# Patient Record
Sex: FEMALE | Race: WHITE | NOT HISPANIC OR LATINO | Employment: OTHER | URBAN - METROPOLITAN AREA
[De-identification: names, ages, dates, MRNs, and addresses within clinical notes are randomized per-mention and may not be internally consistent; named-entity substitution may affect disease eponyms.]

---

## 2017-04-07 ENCOUNTER — OFFICE VISIT (OUTPATIENT)
Dept: DERMATOLOGY | Facility: CLINIC | Age: 62
End: 2017-04-07
Payer: MEDICARE

## 2017-04-07 DIAGNOSIS — D48.5 NEOPLASM OF UNCERTAIN BEHAVIOR OF SKIN: ICD-10-CM

## 2017-04-07 DIAGNOSIS — L82.1 SK (SEBORRHEIC KERATOSIS): ICD-10-CM

## 2017-04-07 DIAGNOSIS — Z85.828 PERSONAL HISTORY OF MALIGNANT NEOPLASM OF SKIN: ICD-10-CM

## 2017-04-07 DIAGNOSIS — L57.0 AK (ACTINIC KERATOSIS): Primary | ICD-10-CM

## 2017-04-07 PROCEDURE — 11100 PR BIOPSY OF SKIN LESION: CPT | Mod: PBBFAC | Performed by: DERMATOLOGY

## 2017-04-07 PROCEDURE — 88305 TISSUE EXAM BY PATHOLOGIST: CPT | Performed by: PATHOLOGY

## 2017-04-07 PROCEDURE — 11100 PR BIOPSY OF SKIN LESION: CPT | Mod: S$PBB,,, | Performed by: DERMATOLOGY

## 2017-04-07 PROCEDURE — 99999 PR PBB SHADOW E&M-EST. PATIENT-LVL III: CPT | Mod: PBBFAC,,, | Performed by: DERMATOLOGY

## 2017-04-07 PROCEDURE — 99214 OFFICE O/P EST MOD 30 MIN: CPT | Mod: 25,S$PBB,, | Performed by: DERMATOLOGY

## 2017-04-07 PROCEDURE — 99213 OFFICE O/P EST LOW 20 MIN: CPT | Mod: PBBFAC,25 | Performed by: DERMATOLOGY

## 2017-04-07 NOTE — MR AVS SNAPSHOT
[facility]  [address]  [facility] [address]  Phone: [phone]  Fax: [phone]                  Antoinette Hayden   2017 2:00 PM   Office Visit    Description:  Female : 1955   Provider:  Ivone Gray MD   Department:  [facility]           Reason for Visit     Skin Check           Diagnoses this Visit        Comments    AK (actinic keratosis)    -  Primary     Neoplasm of uncertain behavior of skin         SK (seborrheic keratosis)         Personal history of malignant neoplasm of skin                To Do List           Future Appointments        Provider Department Dept Phone    2017 11:00 AM [facility] NURSE, DERM [facility] [phone]    2017 3:30 PM RM Ag - Endo/Diab/Metab [phone]      Goals (5 Years of Data)     None      [facility]     [facility] Care [facility] -  Assistance  Unless otherwise directed by your provider, please contact [facility], our nurse care line that is available for  assistance.     Registered nurses in the [facility] provide: appointment scheduling, clinical advisement, health education, and other advisory services.  Call: [phone] (toll free)               Medications           Message regarding Medications     Verify the changes and/or additions to your medication regime listed below are the same as discussed with your clinician today.  If any of these changes or additions are incorrect, please notify your healthcare provider.             Verify that the below list of medications is an accurate representation of the medications you are currently taking.  If none reported, the list may be blank. If incorrect, please contact your healthcare provider. Carry this list with you in case of emergency.           Current Medications     clonazePAM (KLONOPIN) 0.5 MG tablet Take 1 tablet (0.5 mg total) by mouth 2 (two) times daily as needed  for Anxiety.    cyanocobalamin (VITAMIN B-12) 1000 MCG tablet Take 100 mcg by mouth once daily.    escitalopram oxalate (LEXAPRO) 20 MG tablet Take 1 tablet (20 mg total) by mouth once daily.    gabapentin (NEURONTIN) 300 MG capsule TAKE 1 CAPSULE(300 MG) BY MOUTH THREE TIMES DAILY    gemfibrozil (LOPID) 600 MG tablet Take 1 tablet (600 mg total) by mouth 2 (two) times daily before meals.    glucosamine-chondroitin 500-400 mg tablet Take 1 tablet by mouth 2 (two) times daily.     ibuprofen (ADVIL,MOTRIN) 200 MG tablet Take 200 mg by mouth every 6 (six) hours as needed.    levothyroxine (SYNTHROID) 25 MCG tablet TAKE 1 TABLET(25 MCG) BY MOUTH EVERY DAY    MULTIVIT WITH CALCIUM,IRON,MIN (WOMEN'S DAILY MULTIVITAMIN ORAL) Take by mouth every morning.     oxybutynin (DITROPAN-XL) 10 MG 24 hr tablet TAKE 1 TABLET(10 MG) BY MOUTH EVERY DAY           Clinical Reference Information           Allergies as of 4/7/2017     Egg Derived    Egg    No Known Drug Allergies      Immunizations Administered on Date of Encounter - 4/7/2017     None      Language Assistance Services     ATTENTION: Language assistance services are available, free of charge. Please call [phone].      ATENCIÓN: Si juniela brett, tiene a dawn disposición servicios gratuitos de asistencia lingüística. Llame al [phone].     RACHEL Ý: N?u b?n nói Ti?ng Vi?t, có các d?ch v? h? tr? ngôn ng? mi?n phí dành cho b?n. G?i s? [phone].         [facility] complies with applicable Federal civil rights laws and does not discriminate on the basis of race, color, national origin, age, disability, or sex.

## 2017-04-07 NOTE — PROGRESS NOTES
Subjective:       Patient ID:  Antoinette Hayden is a 61 y.o. female who presents for   Chief Complaint   Patient presents with    Skin Check     UBSE     HPI  Last skin check 8/2016 with [facility].  She presents today for evaluation of a spot on her L cheek x several weeks, non healing, denies bleeding, no prior treatments.  She also noticed rough pink spots on her arms, thinks some of them have been treated with LN.  Denies bleeding.    Derm Hx: multiple NMSCs; Last skin cancer L upper arm SCCIS ; PDT face in 10/2016  Past Medical History:   Diagnosis Date    AK (actinic keratosis) 7-14    1st PDT face,60mins    Allergy     Anxiety     Arthritis     Depression     Fever blister     Hyperlipidemia     Keloid scar of skin     PONV (postoperative nausea and vomiting)     Scar of face     SCC (squamous cell carcinoma) excised 9/7/16    R upper arm    SCC (squamous cell carcinoma) excised 9/21/16    in situ, L upper arm    SQUAMOUS CELL CARCINOMA     skin CA on face, arm and chest     Review of Systems   Constitutional: Negative for fever, chills, fatigue and malaise.   Skin: Positive for activity-related sunscreen use. Negative for daily sunscreen use and recent sunburn.   Hematologic/Lymphatic: Does not bruise/bleed easily.        Objective:    Physical Exam   Constitutional: She appears well-developed and well-nourished. No distress.   Neurological: She is alert and oriented to person, place, and time. She is not disoriented.   Psychiatric: She has a normal mood and affect.   Skin:   Areas Examined (abnormalities noted in diagram):   Scalp / Hair Palpated and Inspected  Head / Face Inspection Performed  Neck Inspection Performed  Chest / Axilla Inspection Performed  Abdomen Inspection Performed  Genitals / Buttocks / Groin Inspection Performed  Back Inspection Performed  RUE Inspected  LUE Inspection Performed  RLE Inspected  LLE Inspection Performed  Nails and Digits Inspection Performed                        Diagram Legend     Erythematous scaling macule/papule c/w actinic keratosis       Vascular papule c/w angioma      Pigmented verrucoid papule/plaque c/w seborrheic keratosis      Yellow umbilicated papule c/w sebaceous hyperplasia      Irregularly shaped tan macule c/w lentigo     1-2 mm smooth white papules consistent with Milia      Movable subcutaneous cyst with punctum c/w epidermal inclusion cyst      Subcutaneous movable cyst c/w pilar cyst      Firm pink to brown papule c/w dermatofibroma      Pedunculated fleshy papule(s) c/w skin tag(s)      Evenly pigmented macule c/w junctional nevus     Mildly variegated pigmented, slightly irregular-bordered macule c/w mildly atypical nevus      Flesh colored to evenly pigmented papule c/w intradermal nevus       Pink pearly papule/plaque c/w basal cell carcinoma      Erythematous hyperkeratotic cursted plaque c/w SCC      Surgical scar with no sign of skin cancer recurrence      Open and closed comedones      Inflammatory papules and pustules      Verrucoid papule consistent consistent with wart     Erythematous eczematous patches and plaques     Dystrophic onycholytic nail with subungual debris c/w onychomycosis     Umbilicated papule    Erythematous-base heme-crusted tan verrucoid plaque consistent with inflamed seborrheic keratosis     Erythematous Silvery Scaling Plaque c/w Psoriasis     See annotation      Assessment / Plan:      Pathology Orders:      Normal Orders This Visit    Tissue Specimen To Pathology, Dermatology     Questions:    Directional Terms:  Other(comment)    Clinical information:  r/o HAK vs SCC vs other    Specific Site:  L cheek        AK (actinic keratosis)  Recommended PDT x 2 to forearms     Neoplasm of uncertain behavior of skin  -     Tissue Specimen To Pathology, Dermatology  Shave biopsy procedure note:    Shave biopsy performed after verbal consent icluding risk of infection, scar, recurrence, need for additional treatment of site.  Area prepped with alcohol, anesthetized with approximately 1.0cc of 1% lidocaine with epinephrine. Lesional tissue shaved with razor blade. Hemostasis achieved with application of aluminum chloride followed by hyfrecation. No complications. Dressing applied. Wound care explained.    SK (seborrheic keratosis)  These are benign inherited growths without a malignant potential. Reassurance given to patient. No treatment is necessary.     Personal history of malignant neoplasm of skin  Area(s) of previous NMSC evaluated with no signs of recurrence.    Skin examination performed today including at least 6 points as noted in physical examination           Return for pending Pathology.

## 2017-04-27 ENCOUNTER — INITIAL CONSULT (OUTPATIENT)
Dept: DERMATOLOGY | Facility: CLINIC | Age: 62
End: 2017-04-27
Payer: MEDICARE

## 2017-04-27 VITALS
DIASTOLIC BLOOD PRESSURE: 79 MMHG | HEART RATE: 61 BPM | HEIGHT: 65 IN | SYSTOLIC BLOOD PRESSURE: 118 MMHG | BODY MASS INDEX: 29.16 KG/M2 | WEIGHT: 175 LBS

## 2017-04-27 DIAGNOSIS — C44.329 SQUAMOUS CELL CARCINOMA OF SKIN OF LEFT CHEEK: Primary | ICD-10-CM

## 2017-04-27 PROCEDURE — 99999 PR PBB SHADOW E&M-EST. PATIENT-LVL III: CPT | Mod: PBBFAC,,, | Performed by: DERMATOLOGY

## 2017-04-27 PROCEDURE — 99214 OFFICE O/P EST MOD 30 MIN: CPT | Mod: S$PBB,,, | Performed by: DERMATOLOGY

## 2017-04-27 PROCEDURE — 99213 OFFICE O/P EST LOW 20 MIN: CPT | Mod: PBBFAC | Performed by: DERMATOLOGY

## 2017-04-27 NOTE — PROGRESS NOTES
"REFERRING MD:  Iovne Gray MD    CHIEF COMPLAINT:  New patient being consulted for Mohs' surgery evaluation.    HISTORY OF PRESENT ILLNESS:  62 y.o. female presents with a 3-4 month(s) history of growth on the L cheek.  Has had a long-standing history of skin cancers treated by Dr. Irving - "he just dug them out" and she has profound scarring on her face from these procedures.  Had forehead flap that failed, requiring her to see Dr Arellano. Now seeing Dr. Watson and Isaac for skin checks.     Negative for scabbing.  Positive for crusting.  Positive for bleeding.  Negative for itching.    Biopsy consistent with squamous cell carcinoma.     No prior treatment.    Pacemaker: No  Defibrillator: No  Artificial joints: No  Artificial heart valves: No    PAST MEDICAL HISTORY:  Past Medical History:   Diagnosis Date    AK (actinic keratosis) 7-14    1st PDT face,60mins    Allergy     Anxiety     Arthritis     Depression     Fever blister     Hyperlipidemia     Keloid scar of skin     Mechanical heart valve present     PONV (postoperative nausea and vomiting)     Scar of face     SCC (squamous cell carcinoma) excised 16    R upper arm    SCC (squamous cell carcinoma) excised 16    in situ, L upper arm    SQUAMOUS CELL CARCINOMA     skin CA on face, arm and chest       PAST SURGICAL HISTORY:  Past Surgical History:   Procedure Laterality Date     SECTION, LOW TRANSVERSE      FACIAL RECONSTRUCTION SURGERY      FINGER SURGERY      HYSTERECTOMY      LUNG REMOVAL, PARTIAL  2013    top portion    right lung resection  2013    [facility] at [facility]        SOCIAL HISTORY:  Dependencies:  former smoker    PERTINENT MEDICATIONS:  See medications list.    ALLERGIES:  Egg derived; Egg; and No known drug allergies    ROS:  Skin: See HPI  Constitutional: No fatigue, fever, malaise, weight loss, or night sweats.  Cardiovascular: No chest pain, palpitations, or edema.  Respiratory: No coughing, " wheezing, SOB, or sputum production.    Physical Exam   HENT:   Head:             General: Mood and affect normal. Alert and orient X3. Normal appearance.  Head/Face: L mid cheek with a 4 x 4 mm pink bx site located 2.5 cm laterally from the left alar base and 2 cm superiorly.   Neck:  no suspicious lesions  Lymph nodes: Bilateral pre-auricular, post-auricular, anterior cervical, posterior cervical, sublingual, submental, and occipital are not enlarged    IMPRESSION:  Biopsy proven superficially invasive squamous cell carcinoma, L cheek, path# [id number].    PLAN:  The diagnosis and the pathology report were discussed in detail with the patient. Treatment options were reviewed, including Mohs Micrographic Surgery, radiation, topical therapy, and standard excision.  After careful review of patient's history and physical exam, and after discussion of treatment options, the decision was made to perform Mohs micrographic surgery.    Scheduled patient for Mohs Micrographic Surgery. Risks, benefits, and alternatives of Mohs' surgery discussed with the patient. Discussed repair options including complex closure, skin flap, skin graft and second intention healing with the patient. Pre-operative instructions provided to the patient.

## 2017-04-27 NOTE — MR AVS SNAPSHOT
[facility] - Dermatology Surgery  [address] [address]  Phone: [phone]  Fax: [phone]                  Antoinette Hayden   2017 3:10 PM   Initial consult    Description:  Female : 1955   Provider:  Kris Munguia MD   Department:  [facility] - [facility] Surgery           Reason for Visit     Squamous Cell Carcinoma                To Do List           Future Appointments        Provider Department Dept Phone    2017 3:10 PM Kris Munguia MD [facility] Dermatology Surgery [phone]    2017 10:00 AM PDT, APPT [facility] Dermatology [phone]    2017 10:00 AM PDT, APPT [facility] [phone]    2017 8:00 AM AGUILA Rooney [facility] Endocrinology [phone]      Goals (5 Years of Data)     None      [facility] On [facility]     [facility] Care Line -  Assistance  Unless otherwise directed by your provider, please contact [facility], our nurse care line that is available for  assistance.     Registered nurses in the [facility] provide: appointment scheduling, clinical advisement, health education, and other advisory services.  Call: [phone] (toll free)               Medications           Message regarding Medications     Verify the changes and/or additions to your medication regime listed below are the same as discussed with your clinician today.  If any of these changes or additions are incorrect, please notify your healthcare provider.             Verify that the below list of medications is an accurate representation of the medications you are currently taking.  If none reported, the list may be blank. If incorrect, please contact your healthcare provider. Carry this list with you in case of emergency.           Current Medications     clonazePAM (KLONOPIN) 0.5 MG tablet Take 1 tablet (0.5 mg total) by mouth 2 (two) times daily as needed for Anxiety.    cyanocobalamin (VITAMIN B-12) 1000  "MCG tablet Take 100 mcg by mouth once daily.    escitalopram oxalate (LEXAPRO) 20 MG tablet Take 1 tablet (20 mg total) by mouth once daily.    gabapentin (NEURONTIN) 300 MG capsule Take 1 capsule (300 mg total) by mouth 3 (three) times daily.    gemfibrozil (LOPID) 600 MG tablet Take 1 tablet (600 mg total) by mouth 2 (two) times daily before meals.    glucosamine-chondroitin 500-400 mg tablet Take 1 tablet by mouth 2 (two) times daily.     ibuprofen (ADVIL,MOTRIN) 200 MG tablet Take 200 mg by mouth every 6 (six) hours as needed.    levothyroxine (SYNTHROID) 25 MCG tablet Take 1 tablet (25 mcg total) by mouth before breakfast.    MULTIVIT WITH CALCIUM,IRON,MIN (WOMEN'S DAILY MULTIVITAMIN ORAL) Take by mouth every morning.     oxybutynin (DITROPAN-XL) 10 MG 24 hr tablet Take 1 tablet (10 mg total) by mouth once daily.           Clinical Reference Information           Your Vitals Were     BP Pulse Height Weight BMI    118/79 (BP Location: Left arm, Patient Position: Sitting, BP Method: Automatic) 61 5' 5" (1.651 m) 79.4 kg (175 lb) 29.12 kg/m2      Blood Pressure          Most Recent Value    BP  118/79      Allergies as of 4/27/2017     Egg Derived    Egg    No Known Drug Allergies      Immunizations Administered on Date of Encounter - 4/27/2017     None      Language Assistance Services     ATTENTION: Language assistance services are available, free of charge. Please call [phone].      ATENCIÓN: Si habla brett, tiene a dawn disposición servicios gratuitos de asistencia lingüística. Zari al [phone].     [facility] Ý: N?u b?n nói Ti?ng Vi?t, có các d?ch v? h? tr? ngôn ng? mi?n phí dành cho b?n. G?i s? [phone].         [facility] complies with applicable Federal civil rights laws and does not discriminate on the basis of race, color, national origin, age, disability, or sex.        "

## 2017-05-02 ENCOUNTER — CLINICAL SUPPORT (OUTPATIENT)
Dept: DERMATOLOGY | Facility: CLINIC | Age: 62
End: 2017-05-02
Payer: MEDICARE

## 2017-05-02 ENCOUNTER — PROCEDURE VISIT (OUTPATIENT)
Dept: DERMATOLOGY | Facility: CLINIC | Age: 62
End: 2017-05-02
Payer: MEDICARE

## 2017-05-02 VITALS
WEIGHT: 175 LBS | BODY MASS INDEX: 29.16 KG/M2 | HEIGHT: 65 IN | DIASTOLIC BLOOD PRESSURE: 79 MMHG | HEART RATE: 55 BPM | SYSTOLIC BLOOD PRESSURE: 122 MMHG

## 2017-05-02 DIAGNOSIS — C44.329 SQUAMOUS CELL CARCINOMA OF SKIN OF LEFT CHEEK: Primary | ICD-10-CM

## 2017-05-02 DIAGNOSIS — L57.0 AK (ACTINIC KERATOSIS): ICD-10-CM

## 2017-05-02 PROCEDURE — 99212 OFFICE O/P EST SF 10 MIN: CPT | Mod: 25,PBBFAC

## 2017-05-02 PROCEDURE — 99499 UNLISTED E&M SERVICE: CPT | Mod: S$PBB,,, | Performed by: DERMATOLOGY

## 2017-05-02 PROCEDURE — 17312 MOHS ADDL STAGE: CPT | Mod: S$PBB,,, | Performed by: DERMATOLOGY

## 2017-05-02 PROCEDURE — 96567 PDT DSTR PRMLG LES SKN: CPT | Mod: PBBFAC

## 2017-05-02 PROCEDURE — 99999 PR PBB SHADOW E&M-EST. PATIENT-LVL II: CPT | Mod: 25,PBBFAC,,

## 2017-05-02 PROCEDURE — 99499 UNLISTED E&M SERVICE: CPT | Mod: ,,, | Performed by: DERMATOLOGY

## 2017-05-02 PROCEDURE — 13132 CMPLX RPR F/C/C/M/N/AX/G/H/F: CPT | Mod: 51,S$PBB,, | Performed by: DERMATOLOGY

## 2017-05-02 PROCEDURE — 17311 MOHS 1 STAGE H/N/HF/G: CPT | Mod: S$PBB,,, | Performed by: DERMATOLOGY

## 2017-05-02 RX ORDER — OXYCODONE AND ACETAMINOPHEN 5; 325 MG/1; MG/1
1 TABLET ORAL
Qty: 20 TABLET | Refills: 0 | Status: SHIPPED | OUTPATIENT
Start: 2017-05-02 | End: 2017-11-03

## 2017-05-02 RX ADMIN — AMINOLEVULINIC ACID HYDROCHLORIDE 1.5 ML: KIT at 10:05

## 2017-05-02 NOTE — MR AVS SNAPSHOT
[facility] - Dermatology Surgery  [address]  [facility] [address]  Phone: [phone]  Fax: [phone]                  Antoinette Hayden   2017 7:30 AM   Procedure visit    Description:  Female : 1955   Provider:  Kris Munguia MD   Department:  [facility] - Dermatology Surgery           Reason for Visit     Squamous Cell Carcinoma           Diagnoses this Visit        Comments    Squamous cell carcinoma of skin of left cheek    -  Primary            To Do List           Future Appointments        Provider Department Dept Phone    2017 10:00 AM PDT, APPT [facility] Dermatology [phone]    2017 8:00 AM AGUILA Rooney [facility] Endocrinology [phone]      Goals (5 Years of Data)     None       These Medications        Disp Refills Start End    oxycodone-acetaminophen (PERCOCET) 5-325 mg per tablet 20 tablet 0 2017     Take 1 tablet by mouth every 4 to 6 hours as needed for Pain. - Oral    Pharmacy: [facility] [address] [place] [address], [place] - [address] Ph #: [phone]         [facility]     [facility] Nurse Care Line -  Assistance  Unless otherwise directed by your provider, please contact [facility], our nurse care line that is available for  assistance.     Registered nurses in the [facility] provide: appointment scheduling, clinical advisement, health education, and other advisory services.  Call: [phone] (toll free)               Medications           Message regarding Medications     Verify the changes and/or additions to your medication regime listed below are the same as discussed with your clinician today.  If any of these changes or additions are incorrect, please notify your healthcare provider.        START taking these NEW medications        Refills    oxycodone-acetaminophen (PERCOCET) 5-325 mg per tablet 0    Sig: Take 1 tablet by mouth every 4 to 6 hours as needed  "for Pain.    Class: Print    Route: Oral           Verify that the below list of medications is an accurate representation of the medications you are currently taking.  If none reported, the list may be blank. If incorrect, please contact your healthcare provider. Carry this list with you in case of emergency.           Current Medications     clonazePAM (KLONOPIN) 0.5 MG tablet Take 1 tablet (0.5 mg total) by mouth 2 (two) times daily as needed for Anxiety.    cyanocobalamin (VITAMIN B-12) 1000 MCG tablet Take 100 mcg by mouth once daily.    escitalopram oxalate (LEXAPRO) 20 MG tablet Take 1 tablet (20 mg total) by mouth once daily.    gabapentin (NEURONTIN) 300 MG capsule Take 1 capsule (300 mg total) by mouth 3 (three) times daily.    gemfibrozil (LOPID) 600 MG tablet Take 1 tablet (600 mg total) by mouth 2 (two) times daily before meals.    glucosamine-chondroitin 500-400 mg tablet Take 1 tablet by mouth 2 (two) times daily.     ibuprofen (ADVIL,MOTRIN) 200 MG tablet Take 200 mg by mouth every 6 (six) hours as needed.    levothyroxine (SYNTHROID) 25 MCG tablet Take 1 tablet (25 mcg total) by mouth before breakfast.    MULTIVIT WITH CALCIUM,IRON,MIN (WOMEN'S DAILY MULTIVITAMIN ORAL) Take by mouth every morning.     oxybutynin (DITROPAN-XL) 10 MG 24 hr tablet Take 1 tablet (10 mg total) by mouth once daily.    oxycodone-acetaminophen (PERCOCET) 5-325 mg per tablet Take 1 tablet by mouth every 4 to 6 hours as needed for Pain.           Clinical Reference Information           Your Vitals Were     BP Pulse Height Weight BMI    122/79 (BP Location: Left arm, Patient Position: Sitting, BP Method: Automatic) 55 5' 5" (1.651 m) 79.4 kg (175 lb) 29.12 kg/m2      Blood Pressure          Most Recent Value    BP  122/79      Allergies as of 5/2/2017     Egg Derived    Egg    No Known Drug Allergies      Immunizations Administered on Date of Encounter - 5/2/2017     None      Language Assistance Services     ATTENTION: " Language assistance services are available, free of charge. Please call [phone].      ATENCIÓN: Si habla brett, tiene a dawn disposición servicios gratuitos de asistencia lingüística. Llame al [phone].     CHÚ Ý: N?u b?n nói Ti?ng Vi?t, có các d?ch v? h? tr? ngôn ng? mi?n phí dành cho b?n. G?i s? [phone].         [facility] complies with applicable Federal civil rights laws and does not discriminate on the basis of race, color, national origin, age, disability, or sex.

## 2017-05-02 NOTE — PROGRESS NOTES
PROCEDURE: Mohs' Micrographic Surgery    INDICATION: Location in non-mask areas of face where maximum conservation of tumor-free tissue is needed. Biopsy-proven skin cancer of cosmetically and functionally important areas, including head, neck, genital, hand, foot, or areas known for having difficulty in healing, such as the lower anterior legs. Tumor with ill-defined borders.    REFERRING MD: Ivone Gray MD    CASE NUMBER:     ANESTHETIC: 4 cc 0.5% Lidocaine with Epi 1:200,000 mixed 1:1 with 0.5% Bupivacaine    SURGICAL PREP: Hibiclens    SURGEON: Kris Munguia MD    ASSISTANTS: Aditi Ham PA-C and Ruth Magallanes, Surg Tech    PREOPERATIVE DIAGNOSIS: squamous cell carcinoma    POSTOPERATIVE DIAGNOSIS: squamous cell carcinoma    PATHOLOGIC DIAGNOSIS: squamous cell carcinoma- superficially invasive    HISTOLOGY OF SPECIMENS IN FIRST STAGE:   Tumor Type: Tumor seen. Superficial squamous cell carcinoma: Proliferation of squamous cells exhibiting atypia and infiltrating within the superficial papillary dermis.   Depth of Invasion: epidermis, dermis and subcutaneous tissue  Perineural Invasion: No    HISTOLOGY OF SPECIMENS IN SUBSEQUENT STAGES:  · Tumor Type: No tumor seen.    STAGES OF MOHS' SURGERY PERFORMED: 2    TUMOR-FREE PLANE ACHIEVED: Yes    HEMOSTASIS: electrocoagulation     SPECIMENS: 3 (2 in stage A and 1 in stage B)    LOCATION: left (mid) cheek. Patient verified location.    INITIAL LESION SIZE: 0.4 x 0.5 cm    FINAL DEFECT SIZE: 0.9 x 1.0 cm    WOUND REPAIR/DISPOSITION: The patient tolerated Mohs' Micrographic Surgery for a squamous cell carcinoma very well. When the tumor was completely removed, a repair of the surgical defect was undertaken.      PROCEDURE: Complex Linear Repair    INDICATION: Status post Mohs' Micrographic Surgery for squamous cell carcinoma.    CASE NUMBER:     SURGEON: Kris Munguia MD    ASSISTANTS: Aditi Ham PA-C and Ruth Magallanes, Surg Tech    ANESTHETIC: 2 cc 0.5%  "Lidocaine with Epi 1:200,000 mixed 1:1 with 0.5% Bupivacaine    SURGICAL PREP: Hibiclens    LOCATION: left (mid) cheek    DEFECT SIZE: 0.9 x 1.0 cm    WOUND REPAIR/DISPOSITION:  After the patient's carcinoma had been completely removed with Mohs' Micrographic Surgery, a repair of the surgical defect was undertaken. The patient was returned to the operating suite where the area of left mid cheek was prepped, draped, and anesthetized in the usual sterile fashion. The wound was widely undermined in all directions. Then, electrocoagulation was used to obtain meticulous hemostasis. 5-0 Vicryl buried vertical mattress sutures were placed into the subcutaneous and dermal plane to close the wound and ney the cutaneous wound edge. Bilateral dog ears were identified and were removed by a standard Burow's triangle technique. The cutaneous wound edges were closed using interrupted 5-0 Prolene suture.    The patient tolerated the procedure well.    The area was cleaned and dressed appropriately and the patient was given wound care instructions, as well as appointment for follow-up evaluation. Patient was placed on Percocet 5 prn postop pain.    LENGTH OF REPAIR: 2.8 cm    Vitals:    05/02/17 0730 05/02/17 1001   BP: 129/78 122/79   BP Location: Left arm Left arm   Patient Position: Sitting Sitting   BP Method: Automatic Automatic   Pulse: 69 (!) 55   Weight: 79.4 kg (175 lb)    Height: 5' 5" (1.651 m)          "

## 2017-05-02 NOTE — PROGRESS NOTES
Photodynamic Therapy Note.    PDT ordered per Dr. Gray    Patient here today for 1 st treatment of actinic keratoses using photodynamic therapy.  Risks including but not limited to burning, stinging, redness, swelling, crusting or blistering of the skin of the area treated were discussed with patient.  Patient elects to proceed with photodynamic therapy.    Treatment area:  Forearms & Hands  Treatment area cleaned with rubbing alcohol, Levulan Kerastick (2) applied evenly to entire surface and allowed to absorb for 90 minutes.  Patient then placed under Bayron-U light for 16 minutes 40 seconds.    Patient tolerated treatment well with only mild but tolerable symptoms of discomfort.  Area washed gently with mild soap and water; Zinc oxide sunscreen applied.  Patient advised to avoid any significant light exposure (sun and artificial) for next 48 hours.    RTC:  In 1 month or sooner if concern arises.

## 2017-05-09 ENCOUNTER — OFFICE VISIT (OUTPATIENT)
Dept: DERMATOLOGY | Facility: CLINIC | Age: 62
End: 2017-05-09
Payer: MEDICARE

## 2017-05-09 DIAGNOSIS — Z09 POSTOP CHECK: Primary | ICD-10-CM

## 2017-05-09 PROCEDURE — 99024 POSTOP FOLLOW-UP VISIT: CPT | Mod: ,,, | Performed by: DERMATOLOGY

## 2017-05-09 PROCEDURE — 99212 OFFICE O/P EST SF 10 MIN: CPT | Mod: PBBFAC | Performed by: DERMATOLOGY

## 2017-05-09 PROCEDURE — 99999 PR PBB SHADOW E&M-EST. PATIENT-LVL II: CPT | Mod: PBBFAC,,, | Performed by: DERMATOLOGY

## 2017-05-09 NOTE — PROGRESS NOTES
62 y.o. female patient is here for suture removal following Mohs' surgery.    Patient reports no problems.    WOUND PE:  The left mid cheek sutures intact. Wound healing well. Good skin edges. No signs or symptoms of infection.    IMPRESSION:  Healing operative site from Mohs' surgery SCC, left mid cheek s/p Mohs with CLC, postop day # 7.    PLAN:  Sutures removed today. Steri-strips applied.  Continue wound care.  Keep moist with Aquaphor.  Call if any concern arises.     RTC:  In 3 to 6 months with Ivone Gray MD for skin check or sooner if new concern arises.

## 2017-05-22 ENCOUNTER — TELEPHONE (OUTPATIENT)
Dept: DERMATOLOGY | Facility: CLINIC | Age: 62
End: 2017-05-22

## 2017-06-20 ENCOUNTER — TELEPHONE (OUTPATIENT)
Dept: DERMATOLOGY | Facility: CLINIC | Age: 62
End: 2017-06-20

## 2017-06-22 ENCOUNTER — TELEPHONE (OUTPATIENT)
Dept: DERMATOLOGY | Facility: CLINIC | Age: 62
End: 2017-06-22

## 2017-06-22 NOTE — TELEPHONE ENCOUNTER
Pt was scheduled for PDT on 6-20-17 for PDT and she missed her appt due to the weather and her  had emergency surgery. I called her to reschedule. She wants to wait till her  is feeling better. I told her due to her living in [place], I want to schedule her PDT appt with her other dr appt so she would only make one trip to [place].

## 2017-08-04 ENCOUNTER — TELEPHONE (OUTPATIENT)
Dept: DERMATOLOGY | Facility: CLINIC | Age: 62
End: 2017-08-04

## 2018-09-25 ENCOUNTER — HOSPITAL ENCOUNTER (OUTPATIENT)
Dept: RADIOLOGY | Facility: HOSPITAL | Age: 63
Discharge: HOME | End: 2018-09-25
Attending: PHYSICAL MEDICINE & REHABILITATION
Payer: MEDICARE

## 2018-09-25 DIAGNOSIS — Z12.31 ENCOUNTER FOR SCREENING MAMMOGRAM FOR BREAST CANCER: ICD-10-CM

## 2018-09-25 DIAGNOSIS — Z12.31 ENCOUNTER FOR SCREENING MAMMOGRAM FOR MALIGNANT NEOPLASM OF BREAST: ICD-10-CM

## 2018-09-25 PROCEDURE — 77067 SCR MAMMO BI INCL CAD: CPT | Mod: 26,,, | Performed by: RADIOLOGY

## 2018-09-25 PROCEDURE — 77063 BREAST TOMOSYNTHESIS BI: CPT | Mod: 26,,, | Performed by: RADIOLOGY

## 2018-09-25 PROCEDURE — 77067 SCR MAMMO BI INCL CAD: CPT | Mod: TC,PO

## 2019-01-23 PROBLEM — C34.11 MALIGNANT NEOPLASM OF UPPER LOBE, RIGHT BRONCHUS OR LUNG: Status: ACTIVE | Noted: 2017-11-07

## 2020-06-16 ENCOUNTER — HOSPITAL ENCOUNTER (OUTPATIENT)
Dept: RADIOLOGY | Facility: HOSPITAL | Age: 65
Discharge: HOME | End: 2020-06-16
Attending: PHYSICAL MEDICINE & REHABILITATION
Payer: MEDICARE

## 2020-06-16 DIAGNOSIS — M54.9 BACK PAIN WITHOUT RADIATION: ICD-10-CM

## 2020-06-16 PROCEDURE — 72110 XR LUMBAR SPINE 4-5 VIEW WITH BENDING VIEWS: ICD-10-PCS | Mod: 26,,, | Performed by: RADIOLOGY

## 2020-06-16 PROCEDURE — 72110 X-RAY EXAM L-2 SPINE 4/>VWS: CPT | Mod: 26,,, | Performed by: RADIOLOGY

## 2020-06-16 PROCEDURE — 72110 X-RAY EXAM L-2 SPINE 4/>VWS: CPT | Mod: TC,FY

## 2020-07-13 ENCOUNTER — HOSPITAL ENCOUNTER (OUTPATIENT)
Dept: RADIOLOGY | Facility: HOSPITAL | Age: 65
Discharge: HOME | End: 2020-07-13
Attending: PHYSICAL MEDICINE & REHABILITATION
Payer: MEDICARE

## 2020-07-13 DIAGNOSIS — Z12.31 ENCOUNTER FOR SCREENING MAMMOGRAM FOR MALIGNANT NEOPLASM OF BREAST: ICD-10-CM

## 2020-07-13 DIAGNOSIS — M54.50 LOW BACK PAIN AT MULTIPLE SITES: ICD-10-CM

## 2020-07-13 PROCEDURE — 77063 BREAST TOMOSYNTHESIS BI: CPT | Mod: 26,,, | Performed by: RADIOLOGY

## 2020-07-13 PROCEDURE — 72148 MRI LUMBAR SPINE W/O DYE: CPT | Mod: TC

## 2020-07-13 PROCEDURE — 72148 MRI LUMBAR SPINE WITHOUT CONTRAST: ICD-10-PCS | Mod: 26,,, | Performed by: RADIOLOGY

## 2020-07-13 PROCEDURE — 77067 SCR MAMMO BI INCL CAD: CPT | Mod: TC

## 2020-07-13 PROCEDURE — 77063 MAMMO DIGITAL SCREENING BILAT WITH TOMOSYNTHESIS_CAD: ICD-10-PCS | Mod: 26,,, | Performed by: RADIOLOGY

## 2020-07-13 PROCEDURE — 77067 MAMMO DIGITAL SCREENING BILAT WITH TOMOSYNTHESIS_CAD: ICD-10-PCS | Mod: 26,,, | Performed by: RADIOLOGY

## 2020-07-13 PROCEDURE — 72148 MRI LUMBAR SPINE W/O DYE: CPT | Mod: 26,,, | Performed by: RADIOLOGY

## 2020-07-13 PROCEDURE — 77067 SCR MAMMO BI INCL CAD: CPT | Mod: 26,,, | Performed by: RADIOLOGY

## 2020-08-10 ENCOUNTER — HOSPITAL ENCOUNTER (OUTPATIENT)
Dept: RADIOLOGY | Facility: HOSPITAL | Age: 65
Discharge: HOME | End: 2020-08-10
Attending: PHYSICAL MEDICINE & REHABILITATION
Payer: MEDICARE

## 2020-08-10 DIAGNOSIS — E04.1 THYROID NODULE: ICD-10-CM

## 2020-08-10 PROCEDURE — 76536 US EXAM OF HEAD AND NECK: CPT | Mod: TC,PO

## 2021-08-09 DIAGNOSIS — M85.89 OTHER SPECIFIED DISORDERS OF BONE DENSITY AND STRUCTURE, MULTIPLE SITES: Primary | ICD-10-CM

## 2021-08-09 DIAGNOSIS — M85.89 DISAPPEARING BONE DISEASE: Primary | ICD-10-CM

## 2021-08-09 DIAGNOSIS — E04.1 NONTOXIC SINGLE THYROID NODULE: Primary | ICD-10-CM

## 2021-08-13 ENCOUNTER — HOSPITAL ENCOUNTER (OUTPATIENT)
Dept: RADIOLOGY | Facility: HOSPITAL | Age: 66
Discharge: HOME | End: 2021-08-13
Attending: INTERNAL MEDICINE
Payer: MEDICARE

## 2021-08-13 DIAGNOSIS — M85.89 OTHER SPECIFIED DISORDERS OF BONE DENSITY AND STRUCTURE, MULTIPLE SITES: ICD-10-CM

## 2021-08-13 PROCEDURE — 77080 DXA BONE DENSITY AXIAL: CPT | Mod: TC,PO

## 2021-10-06 ENCOUNTER — HOSPITAL ENCOUNTER (OUTPATIENT)
Dept: RADIOLOGY | Facility: HOSPITAL | Age: 66
Discharge: HOME | End: 2021-10-06
Attending: PHYSICAL MEDICINE & REHABILITATION
Payer: MEDICARE

## 2021-10-06 DIAGNOSIS — M54.9 ACUTE MID BACK PAIN: ICD-10-CM

## 2021-10-06 PROCEDURE — 72074 X-RAY EXAM THORAC SPINE4/>VW: CPT | Mod: 26,,, | Performed by: RADIOLOGY

## 2021-10-06 PROCEDURE — 72074 XR THORACIC SPINE 4 OR MORE VIEWS: ICD-10-PCS | Mod: 26,,, | Performed by: RADIOLOGY

## 2021-10-06 PROCEDURE — 72074 X-RAY EXAM THORAC SPINE4/>VW: CPT | Mod: TC,FY

## 2022-04-18 ENCOUNTER — HOSPITAL ENCOUNTER (OUTPATIENT)
Dept: RADIOLOGY | Facility: HOSPITAL | Age: 67
Discharge: HOME | End: 2022-04-18
Attending: PHYSICAL MEDICINE & REHABILITATION
Payer: MEDICARE

## 2022-04-18 DIAGNOSIS — Z12.31 ENCOUNTER FOR SCREENING MAMMOGRAM FOR MALIGNANT NEOPLASM OF BREAST: ICD-10-CM

## 2022-04-18 PROCEDURE — 77067 SCR MAMMO BI INCL CAD: CPT | Mod: TC

## 2022-04-18 PROCEDURE — 77063 BREAST TOMOSYNTHESIS BI: CPT | Mod: TC

## 2022-04-18 PROCEDURE — 77067 MAMMO DIGITAL SCREENING BILAT WITH TOMO: ICD-10-PCS | Mod: 26,,, | Performed by: RADIOLOGY

## 2022-04-18 PROCEDURE — 77063 MAMMO DIGITAL SCREENING BILAT WITH TOMO: ICD-10-PCS | Mod: 26,,, | Performed by: RADIOLOGY

## 2022-04-18 PROCEDURE — 77063 BREAST TOMOSYNTHESIS BI: CPT | Mod: 26,,, | Performed by: RADIOLOGY

## 2022-04-18 PROCEDURE — 77067 SCR MAMMO BI INCL CAD: CPT | Mod: 26,,, | Performed by: RADIOLOGY

## 2022-08-01 ENCOUNTER — HOSPITAL ENCOUNTER (OUTPATIENT)
Dept: RADIOLOGY | Facility: HOSPITAL | Age: 67
Discharge: HOME | End: 2022-08-01
Attending: INTERNAL MEDICINE
Payer: MEDICARE

## 2022-08-01 DIAGNOSIS — E04.1 NONTOXIC SINGLE THYROID NODULE: ICD-10-CM

## 2022-08-01 PROCEDURE — 76536 US EXAM OF HEAD AND NECK: CPT | Mod: TC,PO

## 2022-08-25 NOTE — PROGRESS NOTES
[facility] Hematolgy/Oncology  History & Physical    Subjective:      Patient ID:   NAME: Antoinette Hayden : 1955     67 y.o. female    Referring Doc: Zulma  Other Physicians: Qi Osborne Fosella; Luann Lee        Chief Complaint: hx/of lung cancer      HPI:  67 y.o. female with diagnosis of history of right upper lobe lung cancer who has been referred by Dr Maya for evaluation by medical hematology/oncology. She is here by herself. Patient was diagnosed with a pancoast adenocarcinoma of the right lung back in . She had a stage IIB (T3 N0M0) lung cancer and underwent concomitant chemoradiation followed by surgical resection in 2013.She has chronic thoracic radiculopathy and pain syndrome due to the prior cancer. She follows up with [facility] on a yearly basis with Dr Osborne. She had recent colonoscopy with Dr Linda and had several polyps.    She had SCCA skin cancer removed from anterior chest wall above sternum with flap placed with Dr Richardson - she still has stitches in place and is seeing him again in 2 weeks.     She had covid in 2022 which had some delayed recovery.    Breathing ok but she has some chronic BAKER. No CP, HA's or N/V    She went to [facility] this past 2022 and plans to see [facility] again in 2023      Former smoker - quit 14-15yrs ago    She worked in retail at [employer]    Mom had met adenocarcinoma in lungs    Discussed covid19 precautions but she has not been vaccinated        ROS:   GEN: normal without any fever, night sweats or weight loss  HEENT: normal with no HA's, sore throat, stiff neck, changes in vision  CV: normal with no CP, SOB, PND, BAKER or orthopnea  PULM: normal with no SOB, cough, hemoptysis, sputum or pleuritic pain  GI: normal with no abdominal pain, nausea, vomiting, constipation, diarrhea, melanotic stools, BRBPR, or hematemesis  : normal with no hematuria, dysuria  BREAST: normal with no mass, discharge, pain  SKIN: s/p recent flap surgery  on sternum with stitches       Past Medical/Surgical History:  Past Medical History:   Diagnosis Date    AK (actinic keratosis) 7-14    1st PDT face,60mins    Allergy     Anxiety     Arthritis     Depression     Fever blister     Hyperlipidemia     Keloid scar of skin     Mechanical heart valve present     PONV (postoperative nausea and vomiting)     Scar of face     SCC (squamous cell carcinoma) excised 16    R upper arm    SCC (squamous cell carcinoma) excised 16    in situ, L upper arm    Squamous Cell Carcinoma     skin CA on face, arm and chest     Past Surgical History:   Procedure Laterality Date    BREAST BIOPSY Right 2016    core     SECTION, LOW TRANSVERSE      FACIAL RECONSTRUCTION SURGERY      FINGER SURGERY      HYSTERECTOMY      LUNG REMOVAL, PARTIAL  2013    top portion    OOPHORECTOMY      right lung resection  2013    done at [facility]         Allergies:  Review of patient's allergies indicates:   Allergen Reactions    Egg derived Nausea And Vomiting     Patient tolerated propofol, no allergic reaction. - MARIA ISABEL Hurt MD (14)  Patient tolerated propofol, no allergic reaction. Soila Hurt MD (14)    Egg     No known drug allergies        Social/Family History:  Social History     Socioeconomic History    Marital status:    Occupational History     Employer: not employed    Tobacco Use    Smoking status: Former Smoker     Packs/day: 1.50     Years: 43.00     Pack years: 64.50     Quit date: 1/15/2012     Years since quitting: 10.6    Smokeless tobacco: Never Used   Substance and Sexual Activity    Alcohol use: No     Alcohol/week: 0.0 standard drinks     Comment: rarely    Drug use: No    Sexual activity: Yes     Partners: Male     Birth control/protection: None   Other Topics Concern    Are you pregnant or think you may be? No    Breast-feeding No     Family History   Problem Relation Age of Onset    Cancer Mother         lung cancer     Diabetes Father     Hypertension Father     Colon cancer Father 70    Cancer Brother 45        skin cancer - neck    Cancer Maternal Aunt 60        skin cancer -face    Cancer Maternal Uncle         skin cancer - luis&neck    Breast cancer Maternal Grandmother     Melanoma Neg Hx     Lupus Neg Hx     Psoriasis Neg Hx     Eczema Neg Hx          Medications:    Current Outpatient Medications:     biotin 5,000 mcg TbDL, Take 5,000 mcg by mouth once daily., Disp: , Rfl:     cetirizine (ZYRTEC) 10 MG tablet, Take 10 mg by mouth once daily., Disp: , Rfl:     co-enzyme Q-10 30 mg capsule, Take 30 mg by mouth 3 (three) times daily., Disp: , Rfl:     EScitalopram oxalate (LEXAPRO) 10 MG tablet, Take 1 tablet (10 mg total) by mouth once daily., Disp: 90 tablet, Rfl: 0    famotidine (PEPCID) 40 MG tablet, Take 1 tablet (40 mg total) by mouth once daily at 6am., Disp: 90 tablet, Rfl: 0    gabapentin (NEURONTIN) 300 MG capsule, TAKE 1 CAPSULE BY MOUTH THREE TIMES A DAY, Disp: 90 capsule, Rfl: 0    glucosamine-chondroitin 500-400 mg Cap, Take 2 tablets by mouth. , Disp: , Rfl:     ibuprofen (ADVIL,MOTRIN) 200 MG tablet, Take 200 mg by mouth every 6 (six) hours as needed., Disp: , Rfl:     levothyroxine (SYNTHROID) 25 MCG tablet, Take 1 tablet (25 mcg total) by mouth every evening., Disp: 90 tablet, Rfl: 0    lysine (L-LYSINE) 500 mg Tab, Take 500 mg by mouth once daily., Disp: , Rfl:     magnesium 30 mg Tab, Take by mouth once., Disp: , Rfl:     MULTIVIT WITH CALCIUM,IRON,MIN (WOMEN'S DAILY MULTIVITAMIN ORAL), Take by mouth every morning. , Disp: , Rfl:     oxybutynin (DITROPAN-XL) 10 MG 24 hr tablet, Take 1 tablet (10 mg total) by mouth once daily., Disp: 90 tablet, Rfl: 0    polyethylene glycol (GLYCOLAX) 17 gram/dose powder, Take 17 g by mouth Daily., Disp: 119 g, Rfl: 2    simvastatin (ZOCOR) 20 MG tablet, Take 1 tablet (20 mg total) by mouth every evening., Disp: 90 tablet, Rfl: 0    vitamin D  (VITAMIN D3) 1000 units Tab, Take 1,500 Units by mouth once daily., Disp: , Rfl:     silver sulfADIAZINE 1% (SILVADENE) 1 % cream, APPLY TWICE DAILY TO BURN AREA UNTIL HEALED, Disp: , Rfl:     simethicone (GAS-X ORAL), Take 1 application by mouth once daily., Disp: , Rfl:       Pathology:  Cancer Staging  Lung cancer, upper lobe  Staging form: Lung, AJCC 7th Edition  - Clinical: Stage IIB (T3, N0, M0) - Signed by Gurpreet Andino MD on 5/8/2013  - Pathologic: No stage assigned - Unsigned        Objective:   Vitals:  Blood pressure 128/83, pulse 96, temperature 99 °F (37.2 °C), weight 75.3 kg (166 lb).    Physical Examination:   GEN: no apparent distress, comfortable; AAOx3  HEAD: atraumatic and normocephalic  EYES: no pallor, no icterus, PERRLA  ENT: OMM, no pharyngeal erythema, external ears WNL; no nasal discharge; no thrush; s/p Moh's on nares  NECK: no masses, thyroid normal, trachea midline, no LAD/LN's, supple  CV: RRR with no murmur; normal pulse; normal S1 and S2; no pedal edema  CHEST: Normal respiratory effort; CTAB; normal breath sounds; no wheeze or crackles (sternal flap with stitches)  ABDOM: nontender and nondistended; soft; normal bowel sounds; no rebound/guarding  MUSC/Skeletal: ROM normal; no crepitus; joints normal; no deformities or arthropathy; C-shaped scar on right back   EXTREM: no clubbing, cyanosis, inflammation or swelling  SKIN: s/p recent flap surgery on sternum with stitches; chronic age related skin changes; scars on arms, etc  : no bar  NEURO: grossly intact; motor/sensory WNL; AAOx3; no tremors  PSYCH: normal mood, affect and behavior  LYMPH: normal cervical, supraclavicular, axillary and groin LN's      Labs:   Lab Results   Component Value Date    WBC 4.5 03/28/2022    HGB 13.7 03/28/2022    HCT 41.8 03/28/2022    MCV 87.8 03/28/2022     03/28/2022    CMP  Sodium   Date Value Ref Range Status   03/28/2022 143 135 - 146 mmol/L Final     Potassium   Date Value Ref Range  Status   03/28/2022 5.3 3.5 - 5.3 mmol/L Final     Chloride   Date Value Ref Range Status   03/28/2022 107 98 - 110 mmol/L Final     CO2   Date Value Ref Range Status   03/28/2022 27 20 - 32 mmol/L Final     Glucose   Date Value Ref Range Status   03/28/2022 95 65 - 99 mg/dL Final     Comment:                   Fasting reference interval          BUN   Date Value Ref Range Status   03/28/2022 18 7 - 25 mg/dL Final     Creatinine   Date Value Ref Range Status   03/28/2022 0.87 0.50 - 0.99 mg/dL Final     Comment:     For patients >49 years of age, the reference limit  for Creatinine is approximately 13% higher for people  identified as -American.          Calcium   Date Value Ref Range Status   03/28/2022 9.6 8.6 - 10.4 mg/dL Final     Total Protein   Date Value Ref Range Status   03/28/2022 7.2 6.1 - 8.1 g/dL Final     Albumin   Date Value Ref Range Status   03/28/2022 4.3 3.6 - 5.1 g/dL Final     Total Bilirubin   Date Value Ref Range Status   03/28/2022 0.5 0.2 - 1.2 mg/dL Final     Alkaline Phosphatase   Date Value Ref Range Status   05/07/2020 77 37 - 153 U/L Final     AST   Date Value Ref Range Status   03/28/2022 17 10 - 35 U/L Final     ALT   Date Value Ref Range Status   03/28/2022 13 6 - 29 U/L Final     Anion Gap   Date Value Ref Range Status   01/12/2015 13 8 - 16 mmol/L Final     eGFR if    Date Value Ref Range Status   03/28/2022 80 > OR = 60 mL/min/1.73m2 Final     eGFR if non    Date Value Ref Range Status   03/28/2022 69 > OR = 60 mL/min/1.73m2 Final         Radiology/Diagnostic Studies:    CT Chest without  4/7/2022  ([facility])      Anatomical Region Laterality Modality   Chest -- Computed Tomography       Impression      No evidence of recurrent or metastatic disease. New peripheral ground glass opacities in the lungs are suspicious for viral infection.     COVID Impression category:   Commonly reported imaging features of COVID-19 pneumonia are present and involve  less than 50% of the lung volume. Other processes such as influenza pneumonia and organizing pneumonia, as can be seen with drug toxicity and connective tissue disease, can cause a similar imaging pattern.       All lab results and imaging results have been reviewed and discussed with the patient    Assessment:   (1) 67 y.o. female with diagnosis of history of right upper lobe lung cancer who has been referred by Dr Maya for evaluation by medical hematology/oncology. 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 She had a stage IIB (T3 N0M0) lung cancer and underwent concomitant chemoradiation followed by surgical resection in Sept 2013..   - NSCLC (adenocarcinoma) - pancoast  - stage IIB  - s/p carboplatin and pembro x4  - she was treated at [facility] in [place] where she underwent surgery with Dr Osborne on 9/26/2013 with lobectomy and chest wall reconstruction and followed at [facility] by Dr Gurpreet Welch with thoracic oncology  - treated by Dr Gurpreet Andino in past and followed by Dr Roverto Busby, both  with Oncology at [facility] in past    8/26/2022:  - she returns to [facility] in April 2023  - she had recent Chest CT at [facility] iN April 2022  - f/u with Dr Maya and Dr Richardson  - patient encouraged to get dermatologist     (2) Hypercholesterolemia    (3) Heart murmur    (4) Lacrimal duct stenosis    (5) Multiple skin cancers s/p Moh's procedure - followed by Dr Gris Watson with derm in past    (6) Hypothyroidism/thyorid nodule    (7) Chronic thoracic radiculopathy and pain syndrome due to the prior cancer    (8) Anxiety/depression    (9) Fam hx/of colon cancer    (10) Arthritis    (11) Former smoker (quit Jan 2012)    (12) Hx/of tubular adenoma s/p right hemicolectomy    (13) Recent surgery for SCCA involving anterior chest wall on 8/22/2022 s/p flap with Dr Alexandra    VISIT DIAGNOSES:              Personal history of lung cancer    Malignant neoplasm of upper lobe, right bronchus or  lung    Pancoast tumor, unspecified laterality    Malignant neoplasm of upper lobe of right lung    Cancer associated pain    Family history of colon cancer            Plan:     PLAN:  1. Proceed with f/u at [facility] in April 2023  2. F/u with Dr Richardson in 2 weeks for post-op evaluation  3. F/u with Dr Lee  4. Check CEA with next labs  RTC in  6 months  And plan to alternate with her docs at [facility]  Fax note to Hilaria Maya Tandron      COVID-19 Discussion:    I had long discussion with patient and any applicable family about the COVID-19 coronavirus epidemic and the recommended precautions with regard to cancer and/or hematology patients. I have re-iterated the CDC recommendations for adequate hand washing, use of hand -like products, and coughing into elbow, etc. In addition, especially for our patients who are on chemotherapy and/or our otherwise immunocompromised patients, I have recommended avoidance of crowds, including movie theaters, restaurants, churches, etc. I have recommended avoidance of any sick or symptomatic family members and/or friends. I have also recommended avoidance of any raw and unwashed food products, and general avoidance of food items that have not been prepared by themselves. The patient has been asked to call us immediately with any symptom developments, issues, questions or other general concerns.     Pathology Discussion:    I reviewed and discussed the pathology report(s) and radiograph reports (if available) in as simple to understand and/or laymen's terms to the best of my ability. I had an indepth conversation with the patient and went over the patient's individual diagnosis based on the information that was currently available. I discussed the TNM staging process with regard to the patient's particular cancer type, and the calculated stage based on the currently available TNM data and literature. I discussed the available prognostic data with regard to the current  "staging information and how it relates to the prognosis of their particular neoplastic process.      NCCN Guidelines:    I discussed the available treatment option(s) in accordance with the latest literature from the NCCN Clinical Practice Guidelines for the patient's particular type of cancer disorder. The NCCN Guidelines provide a "document evidence-based (and) consensus-driven management" of the care of oncology patients. The treatment recommendations were made not only in accordance to the NCCN guidelines, but also factored in to account the patient's overall age, condition, performance status and their medical co-morbidities. I went over the risks and benefits of the the treatment options (if any could be made) with regard to their particular cancer type, their cancer stage, their age, and their co-morbidities.     I have explained and the patient understands all of  the current recommendation(s). I have answered all of their questions to the best of my ability and to their complete satisfaction.             Thank you for allowing me to participate in this patient's care. Please call with any questions or concerns.    Electronically signed Jason Mc MD    "

## 2022-08-26 ENCOUNTER — OFFICE VISIT (OUTPATIENT)
Dept: HEMATOLOGY/ONCOLOGY | Facility: CLINIC | Age: 67
End: 2022-08-26
Payer: MEDICARE

## 2022-08-26 VITALS
WEIGHT: 166 LBS | SYSTOLIC BLOOD PRESSURE: 128 MMHG | BODY MASS INDEX: 27.62 KG/M2 | HEART RATE: 96 BPM | DIASTOLIC BLOOD PRESSURE: 83 MMHG | TEMPERATURE: 99 F

## 2022-08-26 DIAGNOSIS — Z80.0 FAMILY HISTORY OF COLON CANCER: ICD-10-CM

## 2022-08-26 DIAGNOSIS — C34.10 PANCOAST TUMOR, UNSPECIFIED LATERALITY: ICD-10-CM

## 2022-08-26 DIAGNOSIS — Z85.118 PERSONAL HISTORY OF LUNG CANCER: Primary | ICD-10-CM

## 2022-08-26 DIAGNOSIS — C34.11 MALIGNANT NEOPLASM OF UPPER LOBE, RIGHT BRONCHUS OR LUNG: ICD-10-CM

## 2022-08-26 DIAGNOSIS — C34.11 MALIGNANT NEOPLASM OF UPPER LOBE OF RIGHT LUNG: ICD-10-CM

## 2022-08-26 DIAGNOSIS — G89.3 CANCER ASSOCIATED PAIN: ICD-10-CM

## 2022-08-26 PROCEDURE — 99204 PR OFFICE/OUTPT VISIT, NEW, LEVL IV, 45-59 MIN: ICD-10-PCS | Mod: S$GLB,,, | Performed by: INTERNAL MEDICINE

## 2022-08-26 PROCEDURE — 99204 OFFICE O/P NEW MOD 45 MIN: CPT | Mod: S$GLB,,, | Performed by: INTERNAL MEDICINE

## 2023-02-22 ENCOUNTER — TELEPHONE (OUTPATIENT)
Dept: HEMATOLOGY/ONCOLOGY | Facility: CLINIC | Age: 68
End: 2023-02-22

## 2023-02-24 NOTE — PROGRESS NOTES
[facility] Hematology/Oncology  PROGRESS NOTE - 2nd Follow-up Visit      Subjective:       Patient ID:   NAME: Antoinette Hayden : 1955     67 y.o. female    Referring Doc: Zulma  Other Physicians: Qi Osborne, Eryn; Luann Lee        Chief Complaint: hx/of lung cancer      History of Present Illness:     Patient returns today for a 2nd regularly scheduled follow-up visit.  The patient is here today to go over the results of the recently ordered labs, tests and studies. She is here by herself.     Breathing ok but she has some chronic BAKER. No CP, HA's or N/V    She saw Dr Maya in Dec 2022     She goes back to [facility] on April              ROS:   GEN: normal without any fever, night sweats or weight loss  HEENT: normal with no HA's, sore throat, stiff neck, changes in vision  CV: normal with no CP, SOB, PND, BAKER or orthopnea  PULM: normal with no SOB, cough, hemoptysis, sputum or pleuritic pain  GI: normal with no abdominal pain, nausea, vomiting, constipation, diarrhea, melanotic stools, BRBPR, or hematemesis  : normal with no hematuria, dysuria  BREAST: normal with no mass, discharge, pain  SKIN: normal with no rash, erythema, bruising, or swelling    Pain Scale:    Allergies:  Review of patient's allergies indicates:   Allergen Reactions    Egg derived Nausea And Vomiting     Patient tolerated propofol, no allergic reaction. - MARIA ISABEL Hurt MD (14)  Patient tolerated propofol, no allergic reaction. - MARIA ISABEL Hurt MD (14)    Egg     No known drug allergies        Medications:    Current Outpatient Medications:     acetaminophen-codeine 300-30mg (TYLENOL #3) 300-30 mg Tab, Take 1 tablet by mouth every 6 (six) hours as needed., Disp: , Rfl:     biotin 5,000 mcg TbDL, Take 5,000 mcg by mouth once daily., Disp: , Rfl:     cetirizine (ZYRTEC) 10 MG tablet, Take 1 tablet (10 mg total) by mouth once daily., Disp: 90 tablet, Rfl: 0    co-enzyme Q-10 30 mg capsule, Take 30 mg by mouth 3 (three)  "times daily., Disp: , Rfl:     EScitalopram oxalate (LEXAPRO) 10 MG tablet, Take 1 tablet (10 mg total) by mouth once daily., Disp: 90 tablet, Rfl: 0    famotidine (PEPCID) 40 MG tablet, Take 1 tablet (40 mg total) by mouth every evening., Disp: 90 tablet, Rfl: 0    glucosamine-chondroitin 500-400 mg Cap, Take 2 tablets by mouth. , Disp: , Rfl:     ibuprofen (ADVIL,MOTRIN) 200 MG tablet, Take 200 mg by mouth every 6 (six) hours as needed., Disp: , Rfl:     levothyroxine (SYNTHROID) 25 MCG tablet, Take 1 tablet (25 mcg total) by mouth every evening., Disp: 90 tablet, Rfl: 0    lysine (L-LYSINE) 500 mg Tab, Take 500 mg by mouth once daily., Disp: , Rfl:     magnesium 30 mg Tab, Take by mouth once., Disp: , Rfl:     MULTIVIT WITH CALCIUM,IRON,MIN (WOMEN'S DAILY MULTIVITAMIN ORAL), Take by mouth every morning. , Disp: , Rfl:     polyethylene glycol (GLYCOLAX) 17 gram/dose powder, Take 17 g by mouth Daily., Disp: 119 g, Rfl: 2    silver sulfADIAZINE 1% (SILVADENE) 1 % cream, APPLY TWICE DAILY TO BURN AREA UNTIL HEALED, Disp: , Rfl:     simethicone (GAS-X ORAL), Take 1 application by mouth once daily., Disp: , Rfl:     simvastatin (ZOCOR) 20 MG tablet, Take 1 tablet (20 mg total) by mouth every evening., Disp: 90 tablet, Rfl: 0    vitamin D (VITAMIN D3) 1000 units Tab, Take 1,500 Units by mouth once daily., Disp: , Rfl:     PMHx/PSHx Updates:  See patient's last visit with me on 8/26/2022.  See H&P on 8/26/2022        Pathology:   Cancer Staging   Lung cancer, upper lobe  Staging form: Lung, AJCC 7th Edition  - Clinical: Stage IIB (T3, N0, M0) - Signed by Gurpreet Andino MD on 5/8/2013  - Pathologic: No stage assigned - Unsigned            Objective:     Vitals:  Blood pressure 131/70, pulse 82, temperature 97.3 °F (36.3 °C), resp. rate 18, height 5' 5" (1.651 m), weight 74.4 kg (164 lb), SpO2 98 %.    Physical Examination:   GEN: no apparent distress, comfortable; AAOx3  HEAD: atraumatic and normocephalic  EYES: no pallor, " no icterus, PERRLA  ENT: OMM, no pharyngeal erythema, external ears WNL; no nasal discharge; no thrush; s/p Moh's on nares  NECK: no masses, thyroid normal, trachea midline, no LAD/LN's, supple  CV: RRR with no murmur; normal pulse; normal S1 and S2; no pedal edema  CHEST: Normal respiratory effort; CTAB; normal breath sounds; no wheeze or crackles (sternal flap with stitches)  ABDOM: nontender and nondistended; soft; normal bowel sounds; no rebound/guarding  MUSC/Skeletal: ROM normal; no crepitus; joints normal; no deformities or arthropathy; C-shaped scar on right back   EXTREM: no clubbing, cyanosis, inflammation or swelling  SKIN: s/p recent flap surgery on sternum with stitches; chronic age related skin changes; scars on arms, etc  : no bar  NEURO: grossly intact; motor/sensory WNL; AAOx3; no tremors  PSYCH: normal mood, affect and behavior  LYMPH: normal cervical, supraclavicular, axillary and groin LN's            Labs:     Lab Results   Component Value Date    WBC 6.9 02/23/2023    HGB 15.3 02/23/2023    HCT 44.9 02/23/2023    MCV 85.5 02/23/2023     02/23/2023     CMP  Sodium   Date Value Ref Range Status   02/23/2023 143 135 - 146 mmol/L Final     Potassium   Date Value Ref Range Status   02/23/2023 4.8 3.5 - 5.3 mmol/L Final     Chloride   Date Value Ref Range Status   02/23/2023 106 98 - 110 mmol/L Final     CO2   Date Value Ref Range Status   02/23/2023 28 20 - 32 mmol/L Final     Glucose   Date Value Ref Range Status   02/23/2023 83 65 - 99 mg/dL Final     Comment:                   Fasting reference interval          BUN   Date Value Ref Range Status   02/23/2023 15 7 - 25 mg/dL Final     Creatinine   Date Value Ref Range Status   02/23/2023 0.99 0.50 - 1.05 mg/dL Final     Calcium   Date Value Ref Range Status   02/23/2023 9.9 8.6 - 10.4 mg/dL Final     Total Protein   Date Value Ref Range Status   02/23/2023 7.2 6.1 - 8.1 g/dL Final     Albumin   Date Value Ref Range Status   02/23/2023  4.7 3.6 - 5.1 g/dL Final     Total Bilirubin   Date Value Ref Range Status   02/23/2023 0.6 0.2 - 1.2 mg/dL Final     Alkaline Phosphatase   Date Value Ref Range Status   05/07/2020 77 37 - 153 U/L Final     AST   Date Value Ref Range Status   02/23/2023 17 10 - 35 U/L Final     ALT   Date Value Ref Range Status   02/23/2023 17 6 - 29 U/L Final     Anion Gap   Date Value Ref Range Status   01/12/2015 13 8 - 16 mmol/L Final     eGFR   Date Value Ref Range Status   02/23/2023 62 > OR = 60 mL/min/1.73m2 Final     Comment:     The eGFR is based on the CKD-EPI 2021 equation. To calculate   the new eGFR from a previous Creatinine or Cystatin C  result, go to https://www.kidney.org/professionals/  kdoqi/[email or web address]               Radiology/Diagnostic Studies:    CT Chest without  4/7/2022  (MDA)        Anatomical Region Laterality Modality   Chest -- Computed Tomography         Impression       No evidence of recurrent or metastatic disease. New peripheral ground glass opacities in the lungs are suspicious for viral infection.     COVID Impression category:   Commonly reported imaging features of COVID-19 pneumonia are present and involve less than 50% of the lung volume. Other processes such as influenza pneumonia and organizing pneumonia, as can be seen with drug toxicity and connective tissue disease, can cause a similar imaging pattern.             I have reviewed all available lab results and radiology reports.    Assessment/Plan:   (1) 67 y.o. female with diagnosis of history of right upper lobe lung cancer who has been referred by Dr Maya for evaluation by medical hematology/oncology. 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 She had a stage IIB (T3 N0M0) lung cancer and underwent concomitant chemoradiation followed by surgical resection in Sept 2013..   - NSCLC (adenocarcinoma) - pancoast  - stage IIB  - s/p carboplatin and pembro x4  - she was treated at [facility]  in [place] where she underwent surgery with Dr Osborne on 9/26/2013 with lobectomy and chest wall reconstruction and followed at [facility] by Dr Gurpreet Welch with thoracic oncology  - treated by Dr Gurpreet Andino in past and followed by Dr Roverto Busby, both  with Oncology at [facility] in past     8/26/2022:  - she returns to [facility] in April 2023  - she had recent Chest CT at [facility] iN April 2022  - f/u with Dr Maya and Dr Richardson  - patient encouraged to get dermatologist     2/27/2023:  - she is seeing Dr Maya in near future  - she goes back to [facility] on April 10-11th 2023     (2) Hypercholesterolemia     (3) Heart murmur     (4) Lacrimal duct stenosis     (5) Multiple skin cancers s/p Moh's procedure - followed by Dr Gris Watson with derm in past     (6) Hypothyroidism/thyorid nodule     (7) Chronic thoracic radiculopathy and pain syndrome due to the prior cancer     (8) Anxiety/depression     (9) Fam hx/of colon cancer     (10) Arthritis     (11) Former smoker (quit Jan 2012)     (12) Hx/of tubular adenoma s/p right hemicolectomy     (13) Recent surgery for SCCA involving anterior chest wall on 8/22/2022 s/p flap with Dr Alexandra         VISIT DIAGNOSES:      Personal history of lung cancer    Pancoast tumor, unspecified laterality    Malignant neoplasm of upper lobe, right bronchus or lung    Malignant neoplasm of upper lobe of right lung          PLAN:  1. Proceed with f/u at [facility] in April 2023  2. F/u with Dr Richardson in Aug 2023  3. F/u with Dr Lee  4. Check CEA  every 3 months  RTC in  6 months  and plan to alternate with her docs at [facility]  Fax note to Hilaria Maya Tandron     Discussion:     COVID-19 Discussion:     I had long discussion with patient and any applicable family about the COVID-19 coronavirus epidemic and the recommended precautions with regard to cancer and/or hematology patients. I have re-iterated the CDC recommendations for adequate hand washing, use of hand -like products, and coughing into  "elbow, etc. In addition, especially for our patients who are on chemotherapy and/or our otherwise immunocompromised patients, I have recommended avoidance of crowds, including movie theaters, restaurants, churches, etc. I have recommended avoidance of any sick or symptomatic family members and/or friends. I have also recommended avoidance of any raw and unwashed food products, and general avoidance of food items that have not been prepared by themselves. The patient has been asked to call us immediately with any symptom developments, issues, questions or other general concerns.      Pathology Discussion:     I reviewed and discussed the pathology report(s) and radiograph reports (if available) in as simple to understand and/or laymen's terms to the best of my ability. I had an indepth conversation with the patient and went over the patient's individual diagnosis based on the information that was currently available. I discussed the TNM staging process with regard to the patient's particular cancer type, and the calculated stage based on the currently available TNM data and literature. I discussed the available prognostic data with regard to the current staging information and how it relates to the prognosis of their particular neoplastic process.       NCCN Guidelines:     I discussed the available treatment option(s) in accordance with the latest literature from the NCCN Clinical Practice Guidelines for the patient's particular type of cancer disorder. The NCCN Guidelines provide a "document evidence-based (and) consensus-driven management" of the care of oncology patients. The treatment recommendations were made not only in accordance to the NCCN guidelines, but also factored in to account the patient's overall age, condition, performance status and their medical co-morbidities. I went over the risks and benefits of the the treatment options (if any could be made) with regard to their particular cancer type, their " cancer stage, their age, and their co-morbidities.      I have explained and the patient understands all of  the current recommendation(s). I have answered all of their questions to the best of my ability and to their complete satisfaction.                I spent over 25 mins of time with the patient. Reviewed results of the recently ordered labs, tests and studies; made directives with regards to the results. Over half of this time was spent couseling and coordinating care.    I have explained all of the above in detail and the patient understands all of the current recommendation(s). I have answered all of their questions to the best of my ability and to their complete satisfaction.   The patient is to continue with the current management plan.            Electronically signed by Jason Mc MD        Answers submitted by the patient for this visit:  Review of Systems Questionnaire (Submitted on 2/23/2023)  appetite change : No  unexpected weight change: No  mouth sores: Yes  visual disturbance: No  cough: No  shortness of breath: No  chest pain: No  abdominal pain: No  diarrhea: No  frequency: No  back pain: Yes  rash: No  headaches: No  adenopathy: No  nervous/ anxious: No

## 2023-02-27 ENCOUNTER — OFFICE VISIT (OUTPATIENT)
Dept: HEMATOLOGY/ONCOLOGY | Facility: CLINIC | Age: 68
End: 2023-02-27
Payer: MEDICARE

## 2023-02-27 VITALS
BODY MASS INDEX: 27.32 KG/M2 | TEMPERATURE: 97 F | OXYGEN SATURATION: 98 % | HEIGHT: 65 IN | HEART RATE: 82 BPM | DIASTOLIC BLOOD PRESSURE: 70 MMHG | SYSTOLIC BLOOD PRESSURE: 131 MMHG | RESPIRATION RATE: 18 BRPM | WEIGHT: 164 LBS

## 2023-02-27 DIAGNOSIS — C34.10 PANCOAST TUMOR, UNSPECIFIED LATERALITY: ICD-10-CM

## 2023-02-27 DIAGNOSIS — C34.11 MALIGNANT NEOPLASM OF UPPER LOBE, RIGHT BRONCHUS OR LUNG: ICD-10-CM

## 2023-02-27 DIAGNOSIS — C34.11 MALIGNANT NEOPLASM OF UPPER LOBE OF RIGHT LUNG: ICD-10-CM

## 2023-02-27 DIAGNOSIS — Z85.118 PERSONAL HISTORY OF LUNG CANCER: Primary | ICD-10-CM

## 2023-02-27 PROCEDURE — 99215 OFFICE O/P EST HI 40 MIN: CPT | Mod: S$GLB,,, | Performed by: INTERNAL MEDICINE

## 2023-02-27 PROCEDURE — 99215 PR OFFICE/OUTPT VISIT, EST, LEVL V, 40-54 MIN: ICD-10-PCS | Mod: S$GLB,,, | Performed by: INTERNAL MEDICINE

## 2023-04-20 ENCOUNTER — HOSPITAL ENCOUNTER (OUTPATIENT)
Dept: RADIOLOGY | Facility: HOSPITAL | Age: 68
Discharge: HOME | End: 2023-04-20
Attending: PHYSICAL MEDICINE & REHABILITATION
Payer: MEDICARE

## 2023-04-20 DIAGNOSIS — Z12.31 ENCOUNTER FOR SCREENING MAMMOGRAM FOR MALIGNANT NEOPLASM OF BREAST: ICD-10-CM

## 2023-04-20 PROCEDURE — 77067 MAMMO DIGITAL SCREENING BILAT WITH TOMO: ICD-10-PCS | Mod: 26,,, | Performed by: RADIOLOGY

## 2023-04-20 PROCEDURE — 77063 MAMMO DIGITAL SCREENING BILAT WITH TOMO: ICD-10-PCS | Mod: 26,,, | Performed by: RADIOLOGY

## 2023-04-20 PROCEDURE — 77067 SCR MAMMO BI INCL CAD: CPT | Mod: TC

## 2023-04-20 PROCEDURE — 77067 SCR MAMMO BI INCL CAD: CPT | Mod: 26,,, | Performed by: RADIOLOGY

## 2023-04-20 PROCEDURE — 77063 BREAST TOMOSYNTHESIS BI: CPT | Mod: 26,,, | Performed by: RADIOLOGY

## 2023-05-15 ENCOUNTER — HOSPITAL ENCOUNTER (OUTPATIENT)
Dept: RADIOLOGY | Facility: HOSPITAL | Age: 68
Discharge: HOME | End: 2023-05-15
Attending: PHYSICAL MEDICINE & REHABILITATION
Payer: MEDICARE

## 2023-05-15 DIAGNOSIS — M25.551 BILATERAL HIP PAIN: ICD-10-CM

## 2023-05-15 DIAGNOSIS — M25.552 BILATERAL HIP PAIN: ICD-10-CM

## 2023-05-15 PROCEDURE — 73552 X-RAY EXAM OF FEMUR 2/>: CPT | Mod: 26,50,, | Performed by: RADIOLOGY

## 2023-05-15 PROCEDURE — 73552 XR FEMUR 2 VIEW BILATERAL: ICD-10-PCS | Mod: 26,50,, | Performed by: RADIOLOGY

## 2023-05-15 PROCEDURE — 73552 X-RAY EXAM OF FEMUR 2/>: CPT | Mod: TC,50,FY

## 2023-05-15 PROCEDURE — 72190 X-RAY EXAM OF PELVIS: CPT | Mod: TC,FY

## 2023-05-15 PROCEDURE — 72190 XR PELVIS COMPLETE MIN 3 VIEWS: ICD-10-PCS | Mod: 26,,, | Performed by: RADIOLOGY

## 2023-05-15 PROCEDURE — 72190 X-RAY EXAM OF PELVIS: CPT | Mod: 26,,, | Performed by: RADIOLOGY

## 2023-06-08 PROBLEM — C44.92 SQUAMOUS CELL SKIN CANCER: Status: ACTIVE | Noted: 2022-08-18

## 2023-06-08 PROBLEM — Z90.49 HISTORY OF PARTIAL COLECTOMY: Status: ACTIVE | Noted: 2022-08-04

## 2023-06-08 PROBLEM — K40.90 RIGHT INGUINAL HERNIA: Status: ACTIVE | Noted: 2022-08-04

## 2023-06-13 ENCOUNTER — HOSPITAL ENCOUNTER (OUTPATIENT)
Dept: RADIOLOGY | Facility: HOSPITAL | Age: 68
Discharge: HOME | End: 2023-06-13
Attending: NURSE PRACTITIONER
Payer: MEDICARE

## 2023-06-13 DIAGNOSIS — M54.16 LUMBAR RADICULOPATHY: ICD-10-CM

## 2023-06-13 PROCEDURE — 72148 MRI LUMBAR SPINE W/O DYE: CPT | Mod: 26,,, | Performed by: RADIOLOGY

## 2023-06-13 PROCEDURE — 72148 MRI LUMBAR SPINE WITHOUT CONTRAST: ICD-10-PCS | Mod: 26,,, | Performed by: RADIOLOGY

## 2023-06-13 PROCEDURE — 72148 MRI LUMBAR SPINE W/O DYE: CPT | Mod: TC

## 2023-07-28 LAB
ALBUMIN SERPL-MCNC: 4.2 G/DL (ref 3.6–5.1)
ALBUMIN/GLOB SERPL: 1.8 (CALC) (ref 1–2.5)
ALP SERPL-CCNC: 73 U/L (ref 37–153)
ALT SERPL-CCNC: 15 U/L (ref 6–29)
AST SERPL-CCNC: 13 U/L (ref 10–35)
BASOPHILS # BLD AUTO: 29 CELLS/UL (ref 0–200)
BASOPHILS NFR BLD AUTO: 0.5 %
BILIRUB SERPL-MCNC: 0.4 MG/DL (ref 0.2–1.2)
BUN SERPL-MCNC: 17 MG/DL (ref 7–25)
BUN/CREAT SERPL: NORMAL (CALC) (ref 6–22)
CALCIUM SERPL-MCNC: 9.3 MG/DL (ref 8.6–10.4)
CHLORIDE SERPL-SCNC: 106 MMOL/L (ref 98–110)
CO2 SERPL-SCNC: 28 MMOL/L (ref 20–32)
CREAT SERPL-MCNC: 0.9 MG/DL (ref 0.5–1.05)
EGFR: 70 ML/MIN/1.73M2
EOSINOPHIL # BLD AUTO: 0 CELLS/UL (ref 15–500)
EOSINOPHIL NFR BLD AUTO: 0 %
ERYTHROCYTE [DISTWIDTH] IN BLOOD BY AUTOMATED COUNT: 14.2 % (ref 11–15)
GLOBULIN SER CALC-MCNC: 2.3 G/DL (CALC) (ref 1.9–3.7)
GLUCOSE SERPL-MCNC: 85 MG/DL (ref 65–99)
HCT VFR BLD AUTO: 40.2 % (ref 35–45)
HGB BLD-MCNC: 13.6 G/DL (ref 11.7–15.5)
LYMPHOCYTES # BLD AUTO: 1539 CELLS/UL (ref 850–3900)
LYMPHOCYTES NFR BLD AUTO: 27 %
MCH RBC QN AUTO: 29.8 PG (ref 27–33)
MCHC RBC AUTO-ENTMCNC: 33.8 G/DL (ref 32–36)
MCV RBC AUTO: 88 FL (ref 80–100)
MONOCYTES # BLD AUTO: 599 CELLS/UL (ref 200–950)
MONOCYTES NFR BLD AUTO: 10.5 %
NEUTROPHILS # BLD AUTO: 3534 CELLS/UL (ref 1500–7800)
NEUTROPHILS NFR BLD AUTO: 62 %
PLATELET # BLD AUTO: 136 THOUSAND/UL (ref 140–400)
PMV BLD REES-ECKER: 9.8 FL (ref 7.5–12.5)
POTASSIUM SERPL-SCNC: 4.6 MMOL/L (ref 3.5–5.3)
PROT SERPL-MCNC: 6.5 G/DL (ref 6.1–8.1)
RBC # BLD AUTO: 4.57 MILLION/UL (ref 3.8–5.1)
SODIUM SERPL-SCNC: 142 MMOL/L (ref 135–146)
WBC # BLD AUTO: 5.7 THOUSAND/UL (ref 3.8–10.8)

## 2023-08-07 DIAGNOSIS — M85.89 OTHER SPECIFIED DISORDERS OF BONE DENSITY AND STRUCTURE, MULTIPLE SITES: Primary | ICD-10-CM

## 2023-08-21 ENCOUNTER — TELEPHONE (OUTPATIENT)
Dept: HEMATOLOGY/ONCOLOGY | Facility: CLINIC | Age: 68
End: 2023-08-21

## 2023-08-21 NOTE — TELEPHONE ENCOUNTER
I spoke with pt and reminded her to have labs done prior to appt here on 8/28/23 pt states she had them done on 8/8/23 sina @ [facility]. I sent Nory a message to check [facility] portal to see if results are back.

## 2023-08-22 ENCOUNTER — TELEPHONE (OUTPATIENT)
Dept: HEMATOLOGY/ONCOLOGY | Facility: CLINIC | Age: 68
End: 2023-08-22

## 2023-08-22 NOTE — TELEPHONE ENCOUNTER
Spoke to Iqra at [facility] as patient states she had labs done around 8/8 for Dr. Mc but we do not have any results. Per Iqra labs were done for another MD on 8/3 which include a CBC and CMP but no CEA. Niurka made aware and will call patient to let her know of need to have CEA done.

## 2023-08-23 ENCOUNTER — TELEPHONE (OUTPATIENT)
Dept: HEMATOLOGY/ONCOLOGY | Facility: CLINIC | Age: 68
End: 2023-08-23

## 2023-08-23 NOTE — TELEPHONE ENCOUNTER
I spoke with pt and advised her that the CEA was not drawn pt states that she will go have it drawn prior to appt.

## 2023-08-27 NOTE — PROGRESS NOTES
[facility], formerly [facility] Hematology/Oncology  PROGRESS NOTE -   Follow-up Visit      Subjective:       Patient ID:   NAME: Antoinette Hayden : 1955     68 y.o. female    Referring Doc: Zulma  Other Physicians: Qi Osborne, Eryn; Luann Lee        Chief Complaint: hx/of lung cancer      History of Present Illness:     Patient returns today for a regularly scheduled follow-up visit.  The patient is here today to go over the results of the recently ordered labs, tests and studies. She is here by herself.     She saw [facility] in 2023 and she has been discharged for f/u with them but will remain available PRN. She had CT scan there on 4/10/2023    Breathing ok but she has some chronic BAKER. No CP, HA's or N/V    She saw Dr Maya in 2023                ROS:   GEN: normal without any fever, night sweats or weight loss  HEENT: normal with no HA's, sore throat, stiff neck, changes in vision  CV: normal with no CP, SOB, PND, BAKER    PULM: normal with no SOB, cough, hemoptysis, sputum or pleuritic pain  GI: normal with no abdominal pain, nausea, vomiting, constipation, diarrhea, melanotic stools, BRBPR, or hematemesis  : normal with no hematuria, dysuria  BREAST: normal with no mass, discharge, pain  SKIN: normal with no rash, erythema, bruising, or swelling    Pain Scale:  0    Allergies:  Review of patient's allergies indicates:   Allergen Reactions    Egg derived Nausea And Vomiting     Patient tolerated propofol, no allergic reaction. - MARIA ISABEL Hurt MD (14)  Patient tolerated propofol, no allergic reaction. Soila Hurt MD (14)    Egg     No known drug allergies        Medications:    Current Outpatient Medications:     acetaminophen-codeine 300-30mg (TYLENOL #3) 300-30 mg Tab, Take 1 tablet by mouth every 6 (six) hours as needed., Disp: , Rfl:     biotin 5,000 mcg TbDL, Take 5,000 mcg by mouth once daily., Disp: , Rfl:     cetirizine (ZYRTEC) 10 MG tablet, TAKE 1 TABLET BY MOUTH EVERY DAY, Disp: 30  "tablet, Rfl: 0    co-enzyme Q-10 30 mg capsule, Take 30 mg by mouth 3 (three) times daily., Disp: , Rfl:     EScitalopram oxalate (LEXAPRO) 10 MG tablet, TAKE 1 TABLET BY MOUTH EVERY DAY, Disp: 90 tablet, Rfl: 0    famotidine (PEPCID) 40 MG tablet, TAKE 1 TABLET BY MOUTH EVERY DAY IN THE EVENING, Disp: 90 tablet, Rfl: 0    gabapentin (NEURONTIN) 300 MG capsule, Take 1 capsule (300 mg total) by mouth 3 (three) times daily as needed., Disp: 90 capsule, Rfl: 0    glucosamine-chondroitin 500-400 mg Cap, Take 2 tablets by mouth. , Disp: , Rfl:     ibuprofen (ADVIL,MOTRIN) 200 MG tablet, Take 200 mg by mouth every 6 (six) hours as needed., Disp: , Rfl:     levothyroxine (SYNTHROID) 25 MCG tablet, Take 1 tablet (25 mcg total) by mouth every evening., Disp: 90 tablet, Rfl: 0    LYSINE ORAL, Take by mouth., Disp: , Rfl:     magnesium 30 mg Tab, Take by mouth once., Disp: , Rfl:     MULTIVIT WITH CALCIUM,IRON,MIN (WOMEN'S DAILY MULTIVITAMIN ORAL), Take by mouth every morning. , Disp: , Rfl:     oxybutynin (DITROPAN-XL) 10 MG 24 hr tablet, TAKE 1 TABLET BY MOUTH EVERY DAY, Disp: 90 tablet, Rfl: 0    silver sulfADIAZINE 1% (SILVADENE) 1 % cream, APPLY TWICE DAILY TO BURN AREA UNTIL HEALED, Disp: , Rfl:     simethicone (GAS-X ORAL), Take 1 application by mouth once daily., Disp: , Rfl:     simvastatin (ZOCOR) 20 MG tablet, Take 1 tablet (20 mg total) by mouth every evening., Disp: 90 tablet, Rfl: 0    vitamin D (VITAMIN D3) 1000 units Tab, Take 1,500 Units by mouth once daily., Disp: , Rfl:     PMHx/PSHx Updates:  See patient's last visit with me on 2/27/2023  See H&P on 8/26/2022        Pathology:   Cancer Staging   Lung cancer, upper lobe  Staging form: Lung, AJCC 7th Edition  - Clinical: Stage IIB (T3, N0, M0) - Signed by Gurpreet Andino MD on 5/8/2013  - Pathologic: No stage assigned - Unsigned            Objective:     Vitals:  Blood pressure (!) 147/74, pulse 63, temperature 98.4 °F (36.9 °C), resp. rate 18, height 5' 5" " (1.651 m), weight 71.8 kg (158 lb 6.4 oz).    Physical Examination:   GEN: no apparent distress, comfortable; AAOx3  HEAD: atraumatic and normocephalic  EYES: no pallor, no icterus, PERRLA  ENT: OMM, no pharyngeal erythema, external ears WNL; no nasal discharge; no thrush; s/p Moh's on nares  NECK: no masses, thyroid normal, trachea midline, no LAD/LN's, supple  CV: RRR with no murmur; normal pulse; normal S1 and S2; no pedal edema  CHEST: Normal respiratory effort; CTAB; normal breath sounds; no wheeze or crackles   ABDOM: nontender and nondistended; soft; normal bowel sounds; no rebound/guarding  MUSC/Skeletal: ROM normal; no crepitus; joints normal; no deformities or arthropathy; C-shaped scar on right back   EXTREM: no clubbing, cyanosis, inflammation or swelling  SKIN: s/p recent flap surgery on sternum ; chronic age related skin changes; scars on arms, etc  : no bar  NEURO: grossly intact; motor/sensory WNL; AAOx3; no tremors  PSYCH: normal mood, affect and behavior  LYMPH: normal cervical, supraclavicular, axillary and groin LN's            Labs:     Lab Results   Component Value Date    WBC 5.2 08/25/2023    HGB 13.9 08/25/2023    HCT 42.4 08/25/2023    MCV 88.5 08/25/2023     08/25/2023     CMP  Sodium   Date Value Ref Range Status   08/25/2023 142 135 - 146 mmol/L Final     Potassium   Date Value Ref Range Status   08/25/2023 4.6 3.5 - 5.3 mmol/L Final     Chloride   Date Value Ref Range Status   08/25/2023 108 98 - 110 mmol/L Final     CO2   Date Value Ref Range Status   08/25/2023 29 20 - 32 mmol/L Final     Glucose   Date Value Ref Range Status   08/25/2023 92 65 - 99 mg/dL Final     Comment:                   Fasting reference interval          BUN   Date Value Ref Range Status   08/25/2023 16 7 - 25 mg/dL Final     Creatinine   Date Value Ref Range Status   08/25/2023 1.10 (H) 0.50 - 1.05 mg/dL Final     Calcium   Date Value Ref Range Status   08/25/2023 9.4 8.6 - 10.4 mg/dL Final     Total  Protein   Date Value Ref Range Status   08/25/2023 6.9 6.1 - 8.1 g/dL Final     Albumin   Date Value Ref Range Status   08/25/2023 4.3 3.6 - 5.1 g/dL Final     Total Bilirubin   Date Value Ref Range Status   08/25/2023 0.5 0.2 - 1.2 mg/dL Final     Alkaline Phosphatase   Date Value Ref Range Status   05/07/2020 77 37 - 153 U/L Final     AST   Date Value Ref Range Status   08/25/2023 13 10 - 35 U/L Final     ALT   Date Value Ref Range Status   08/25/2023 14 6 - 29 U/L Final     Anion Gap   Date Value Ref Range Status   01/12/2015 13 8 - 16 mmol/L Final     eGFR   Date Value Ref Range Status   08/25/2023 55 (L) > OR = 60 mL/min/1.73m2 Final             Radiology/Diagnostic Studies:    CT Chest 4/10/2023:      Impression      1.  Stable right upper lobectomy with en bloc chest wall resection.   2.  No evidence of recurrent or metastatic disease.   3.  Left lower lobe ground glass opacity may reflect atypical adenomatous hyperplasia, focal fibrosis right limb adenocarcinoma in situ. Continue long-term follow-up.   4.  Multivessel coronary artery disease. Aortic valve calcification without aortic dilatation.        CT Chest without  4/7/2022  (MDA)        Anatomical Region Laterality Modality   Chest -- Computed Tomography         Impression       No evidence of recurrent or metastatic disease. New peripheral ground glass opacities in the lungs are suspicious for viral infection.     COVID Impression category:   Commonly reported imaging features of COVID-19 pneumonia are present and involve less than 50% of the lung volume. Other processes such as influenza pneumonia and organizing pneumonia, as can be seen with drug toxicity and connective tissue disease, can cause a similar imaging pattern.             I have reviewed all available lab results and radiology reports.    Assessment/Plan:   (1) 68 y.o. female with diagnosis of history of right upper lobe lung cancer who has been referred by Dr Maya for evaluation by  medical hematology/oncology. 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 She had a stage IIB (T3 N0M0) lung cancer and underwent concomitant chemoradiation followed by surgical resection in Sept 2013..   - NSCLC (adenocarcinoma) - pancoast  - stage IIB  - s/p carboplatin and pembro x4  - she was treated at [facility] in [place] where she underwent surgery with Dr Osborne on 9/26/2013 with lobectomy and chest wall reconstruction and followed at [facility] by Dr Gurpreet Welch with thoracic oncology  - treated by Dr Gurpreet Andino in past and followed by Dr Roverto Busby, both  with Oncology at [facility] in past     8/26/2022:  - she returns to [facility] in April 2023  - she had recent Chest CT at [facility] iN April 2022  - f/u with Dr Maya and Dr Richardson  - patient encouraged to get dermatologist     2/27/2023:  - she is seeing Dr Maya in near future  - she goes back to [facility] on April 10-11th 2023 8/28/2023:  - he saw [facility] in April 2023 and she has been discharged for f/u with them but will remain avaialble PRN. She had CT scan there on 4/10/2023  - due for repeat CEA       (2) Hypercholesterolemia     (3) Heart murmur     (4) Lacrimal duct stenosis     (5) Multiple skin cancers s/p Moh's procedure - followed by Dr Gris Watson with derm in past     (6) Hypothyroidism/thyorid nodule     (7) Chronic thoracic radiculopathy and pain syndrome due to the prior cancer     (8) Anxiety/depression     (9) Fam hx/of colon cancer     (10) Arthritis     (11) Former smoker (quit Jan 2012)     (12) Hx/of tubular adenoma s/p right hemicolectomy     (13) Recent surgery for SCCA involving anterior chest wall on 8/22/2022 s/p flap with Dr Alexandra         VISIT DIAGNOSES:      Personal history of lung cancer    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 F/u at [facility] as needed from now on  2. F/u with Dr Richardson in Sept 2023  3. F/u with Dr Lee  4. Check CEA  every 3 months; repeat scans in April 2024  RTC in  6 months   Fax note to [facility]ilar[facility] Tandron     Discussion:     COVID-19 Discussion:     I had long discussion with patient and any applicable family about the COVID-19 coronavirus epidemic and the recommended precautions with regard to cancer and/or hematology patients. I have re-iterated the CDC recommendations for adequate hand washing, use of hand -like products, and coughing into elbow, etc. In addition, especially for our patients who are on chemotherapy and/or our otherwise immunocompromised patients, I have recommended avoidance of crowds, including movie theaters, restaurants, churches, etc. I have recommended avoidance of any sick or symptomatic family members and/or friends. I have also recommended avoidance of any raw and unwashed food products, and general avoidance of food items that have not been prepared by themselves. The patient has been asked to call us immediately with any symptom developments, issues, questions or other general concerns.      Pathology Discussion:     I reviewed and discussed the pathology report(s) and radiograph reports (if available) in as simple to understand and/or laymen's terms to the best of my ability. I had an indepth conversation with the patient and went over the patient's individual diagnosis based on the information that was currently available. I discussed the TNM staging process with regard to the patient's particular cancer type, and the calculated stage based on the currently available TNM data and literature. I discussed the available prognostic data with regard to the current staging information and how it relates to the prognosis  "of their particular neoplastic process.       NCCN Guidelines:     I discussed the available treatment option(s) in accordance with the latest literature from the NCCN Clinical Practice Guidelines for the patient's particular type of cancer disorder. The NCCN Guidelines provide a "document evidence-based (and) consensus-driven management" of the care of oncology patients. The treatment recommendations were made not only in accordance to the NCCN guidelines, but also factored in to account the patient's overall age, condition, performance status and their medical co-morbidities. I went over the risks and benefits of the the treatment options (if any could be made) with regard to their particular cancer type, their cancer stage, their age, and their co-morbidities.      I have explained and the patient understands all of  the current recommendation(s). I have answered all of their questions to the best of my ability and to their complete satisfaction.                I spent over 25 mins of time with the patient. Reviewed results of the recently ordered labs, tests and studies; made directives with regards to the results. Over half of this time was spent couseling and coordinating care.    I have explained all of the above in detail and the patient understands all of the current recommendation(s). I have answered all of their questions to the best of my ability and to their complete satisfaction.   The patient is to continue with the current management plan.            Electronically signed by Jason Mc MD           "

## 2023-08-28 ENCOUNTER — OFFICE VISIT (OUTPATIENT)
Dept: HEMATOLOGY/ONCOLOGY | Facility: CLINIC | Age: 68
End: 2023-08-28
Payer: MEDICARE

## 2023-08-28 ENCOUNTER — TELEPHONE (OUTPATIENT)
Dept: HEMATOLOGY/ONCOLOGY | Facility: CLINIC | Age: 68
End: 2023-08-28

## 2023-08-28 VITALS
SYSTOLIC BLOOD PRESSURE: 147 MMHG | WEIGHT: 158.38 LBS | BODY MASS INDEX: 26.39 KG/M2 | HEART RATE: 63 BPM | DIASTOLIC BLOOD PRESSURE: 74 MMHG | RESPIRATION RATE: 18 BRPM | HEIGHT: 65 IN | TEMPERATURE: 98 F

## 2023-08-28 DIAGNOSIS — Z90.49 HISTORY OF PARTIAL COLECTOMY: ICD-10-CM

## 2023-08-28 DIAGNOSIS — G89.3 CANCER ASSOCIATED PAIN: ICD-10-CM

## 2023-08-28 DIAGNOSIS — Z98.890 STATUS POST MOHS SURGERY: ICD-10-CM

## 2023-08-28 DIAGNOSIS — Z85.118 PERSONAL HISTORY OF LUNG CANCER: Primary | ICD-10-CM

## 2023-08-28 DIAGNOSIS — C34.11 MALIGNANT NEOPLASM OF UPPER LOBE OF RIGHT LUNG: ICD-10-CM

## 2023-08-28 DIAGNOSIS — C34.10 PANCOAST TUMOR, UNSPECIFIED LATERALITY: ICD-10-CM

## 2023-08-28 DIAGNOSIS — D61.818 OTHER PANCYTOPENIA: ICD-10-CM

## 2023-08-28 DIAGNOSIS — C44.121: ICD-10-CM

## 2023-08-28 DIAGNOSIS — C34.11 MALIGNANT NEOPLASM OF UPPER LOBE, RIGHT BRONCHUS OR LUNG: ICD-10-CM

## 2023-08-28 PROCEDURE — 99214 OFFICE O/P EST MOD 30 MIN: CPT | Mod: S$GLB,,, | Performed by: INTERNAL MEDICINE

## 2023-08-28 PROCEDURE — 99214 PR OFFICE/OUTPT VISIT, EST, LEVL IV, 30-39 MIN: ICD-10-PCS | Mod: S$GLB,,, | Performed by: INTERNAL MEDICINE

## 2023-08-28 NOTE — TELEPHONE ENCOUNTER
Spoke to Nadeen at [facility] to see if CEA was drawn with last labs as it it in as standing order, states was not drawn and she can not see order in system but can see the standing order for cbc/cmp. Dr. Mc made aware and states he is aware and has placed new lab orders at today's appt. Azra made aware taht [facility] did not drawn and can not see order for CEA from Feb.

## 2023-10-11 ENCOUNTER — HOSPITAL ENCOUNTER (OUTPATIENT)
Dept: RADIOLOGY | Facility: HOSPITAL | Age: 68
Discharge: HOME | End: 2023-10-11
Attending: INTERNAL MEDICINE
Payer: MEDICARE

## 2023-10-11 DIAGNOSIS — M85.89 OTHER SPECIFIED DISORDERS OF BONE DENSITY AND STRUCTURE, MULTIPLE SITES: ICD-10-CM

## 2023-10-11 PROCEDURE — 77080 DXA BONE DENSITY AXIAL: CPT | Mod: TC,PO

## 2023-10-31 ENCOUNTER — OFFICE VISIT (OUTPATIENT)
Dept: URGENT CARE | Facility: CLINIC | Age: 68
End: 2023-10-31
Payer: MEDICARE

## 2023-10-31 VITALS
BODY MASS INDEX: 25.99 KG/M2 | DIASTOLIC BLOOD PRESSURE: 78 MMHG | TEMPERATURE: 98 F | OXYGEN SATURATION: 96 % | SYSTOLIC BLOOD PRESSURE: 118 MMHG | HEIGHT: 65 IN | WEIGHT: 156 LBS | HEART RATE: 62 BPM

## 2023-10-31 DIAGNOSIS — U07.1 COVID: Primary | ICD-10-CM

## 2023-10-31 DIAGNOSIS — R05.8 PRODUCTIVE COUGH: ICD-10-CM

## 2023-10-31 DIAGNOSIS — J02.9 SORE THROAT: ICD-10-CM

## 2023-10-31 LAB
CTP QC/QA: YES
FLUAV AG NPH QL: NEGATIVE
FLUBV AG NPH QL: NEGATIVE
S PYO RRNA THROAT QL PROBE: NEGATIVE
SARS-COV-2 AG RESP QL IA.RAPID: POSITIVE

## 2023-10-31 PROCEDURE — 87880 POCT RAPID STREP A: ICD-10-PCS | Mod: QW,,, | Performed by: NURSE PRACTITIONER

## 2023-10-31 PROCEDURE — 87811 SARS-COV-2 COVID19 W/OPTIC: CPT | Mod: QW,S$GLB,, | Performed by: NURSE PRACTITIONER

## 2023-10-31 PROCEDURE — 87804 POCT INFLUENZA A/B: ICD-10-PCS | Mod: 59,QW,, | Performed by: NURSE PRACTITIONER

## 2023-10-31 PROCEDURE — 87804 INFLUENZA ASSAY W/OPTIC: CPT | Mod: 59,QW,, | Performed by: NURSE PRACTITIONER

## 2023-10-31 PROCEDURE — 99204 OFFICE O/P NEW MOD 45 MIN: CPT | Mod: S$GLB,,, | Performed by: NURSE PRACTITIONER

## 2023-10-31 PROCEDURE — 87811 SARS CORONAVIRUS 2 ANTIGEN POCT, MANUAL READ: ICD-10-PCS | Mod: QW,S$GLB,, | Performed by: NURSE PRACTITIONER

## 2023-10-31 PROCEDURE — 99204 PR OFFICE/OUTPT VISIT, NEW, LEVL IV, 45-59 MIN: ICD-10-PCS | Mod: S$GLB,,, | Performed by: NURSE PRACTITIONER

## 2023-10-31 PROCEDURE — 87880 STREP A ASSAY W/OPTIC: CPT | Mod: QW,,, | Performed by: NURSE PRACTITIONER

## 2023-10-31 RX ORDER — FLUTICASONE PROPIONATE 50 MCG
1 SPRAY, SUSPENSION (ML) NASAL DAILY
Qty: 9.9 ML | Refills: 0 | Status: SHIPPED | OUTPATIENT
Start: 2023-10-31 | End: 2023-11-05

## 2023-10-31 RX ORDER — CHLOPHEDIANOL HCL AND PYRILAMINE MALEATE 12.5; 12.5 MG/5ML; MG/5ML
5 SOLUTION ORAL 2 TIMES DAILY PRN
Qty: 473 ML | Refills: 0 | Status: SHIPPED | OUTPATIENT
Start: 2023-10-31 | End: 2023-11-07

## 2023-10-31 NOTE — PROGRESS NOTES
"Subjective:      Patient ID: Antoinette Hayden is a 68 y.o. female.    Vitals:  height is 5' 5" (1.651 m) and weight is 70.8 kg (156 lb). Her oral temperature is 97.6 °F (36.4 °C). Her blood pressure is 118/78 and her pulse is 62. Her oxygen saturation is 96%.     Chief Complaint: Cough and Laryngitis    Cough  This is a new problem. The current episode started in the past 7 days. The problem has been unchanged. The cough is Productive of sputum.     Respiratory:  Positive for cough.     Objective:     Physical Exam    Assessment:     No diagnosis found.    Plan:       There are no diagnoses linked to this encounter.                "

## 2023-10-31 NOTE — PROGRESS NOTES
CHIEF COMPLAINT  Chief Complaint   Patient presents with    Cough    Laryngitis       HPI  Antoinette Villasenor a 68 y.o. female who presents with complaints of productive cough, laryngitis, sore throat, body aches, and chills.  Patient reports symptoms started 5 days ago.  Patient reports she has been taking over-the-counter Zyrtec and cough meds with no relief of symptoms.  Patient denies fever, abdominal pain, nausea, vomiting, diarrhea, chest pain, shortness of breath.      CURRENT MEDICATIONS  Current Outpatient Medications on File Prior to Visit   Medication Sig Dispense Refill    acetaminophen-codeine 300-30mg (TYLENOL #3) 300-30 mg Tab Take 1 tablet by mouth every 6 (six) hours as needed.      biotin 5,000 mcg TbDL Take 5,000 mcg by mouth once daily.      cetirizine (ZYRTEC) 10 MG tablet TAKE 1 TABLET BY MOUTH EVERY DAY (Patient not taking: Reported on 9/13/2023) 30 tablet 0    co-enzyme Q-10 30 mg capsule Take 30 mg by mouth 3 (three) times daily.      EScitalopram oxalate (LEXAPRO) 10 MG tablet Take 1 tablet (10 mg total) by mouth once daily. 90 tablet 0    famotidine (PEPCID) 40 MG tablet TAKE 1 TABLET BY MOUTH EVERY DAY IN THE EVENING 90 tablet 0    folic acid 20 mg Cap Take 20 mg by mouth every evening.      gabapentin (NEURONTIN) 300 MG capsule Take 1 capsule (300 mg total) by mouth 3 (three) times daily as needed. 90 capsule 0    glucosamine-chondroitin 500-400 mg Cap Take 2 tablets by mouth.       ibuprofen (ADVIL,MOTRIN) 200 MG tablet Take 200 mg by mouth every 6 (six) hours as needed.      levothyroxine (SYNTHROID) 25 MCG tablet Take 1 tablet (25 mcg total) by mouth every evening. 90 tablet 0    LYSINE ORAL Take by mouth.      magnesium 30 mg Tab Take by mouth once.      MULTIVIT WITH CALCIUM,IRON,MIN (WOMEN'S DAILY MULTIVITAMIN ORAL) Take by mouth every morning.       oxybutynin (DITROPAN-XL) 10 MG 24 hr tablet Take 1 tablet (10 mg total) by mouth once daily. 90 tablet 0    silver sulfADIAZINE 1%  (SILVADENE) 1 % cream APPLY TWICE DAILY TO BURN AREA UNTIL HEALED      simethicone (GAS-X ORAL) Take 1 application by mouth once daily.      simvastatin (ZOCOR) 20 MG tablet Take 1 tablet (20 mg total) by mouth every evening. 90 tablet 0    vitamin D (VITAMIN D3) 1000 units Tab Take 1,500 Units by mouth once daily.       No current facility-administered medications on file prior to visit.       ALLERGIES  Review of patient's allergies indicates:   Allergen Reactions    Egg derived Nausea And Vomiting     Patient tolerated propofol, no allergic reaction. - MARIA ISABEL Hurt MD (14)  Patient tolerated propofol, no allergic reaction. - MARIA ISABEL Hurt MD (14)    Egg     No known drug allergies        Immunization History   Administered Date(s) Administered    Pneumococcal Conjugate - 20 Valent 2022    Tdap 2017       PAST MEDICAL HISTORY  Past Medical History:   Diagnosis Date    AK (actinic keratosis) -    1st PDT face,60mins    Allergy     Anxiety     Arthritis     Depression     Fever blister     Hyperlipidemia     Keloid scar of skin     Mechanical heart valve present     PONV (postoperative nausea and vomiting)     Scar of face     SCC (squamous cell carcinoma) excised 16    R upper arm    SCC (squamous cell carcinoma) excised 16    in situ, L upper arm    Squamous Cell Carcinoma     skin CA on face, arm and chest       SURGICAL HISTORY  Past Surgical History:   Procedure Laterality Date    BREAST BIOPSY Right 2016    core     SECTION, LOW TRANSVERSE      FACIAL RECONSTRUCTION SURGERY      FINGER SURGERY      HYSTERECTOMY      LUNG REMOVAL, PARTIAL  2013    top portion    OOPHORECTOMY      right lung resection  2013    done at [facility]       SOCIAL HISTORY  Social History     Socioeconomic History    Marital status:    Occupational History     Employer: not employed    Tobacco Use    Smoking status: Former     Current packs/day: 0.00     Average packs/day: 1.5 packs/day for  43.0 years (64.5 ttl pk-yrs)     Types: Cigarettes     Start date: 1/15/1969     Quit date: 1/15/2012     Years since quittin.8    Smokeless tobacco: Never   Substance and Sexual Activity    Alcohol use: No     Alcohol/week: 0.0 standard drinks of alcohol     Comment: rarely    Drug use: No    Sexual activity: Yes     Partners: Male     Birth control/protection: None   Other Topics Concern    Are you pregnant or think you may be? No    Breast-feeding No       FAMILY HISTORY  Family History   Problem Relation Age of Onset    Cancer Mother         lung cancer    Diabetes Father     Hypertension Father     Colon cancer Father 70    Cancer Brother 45        skin cancer - neck    Cancer Maternal Aunt 60        skin cancer -face    Cancer Maternal Uncle         skin cancer - luis&neck    Breast cancer Maternal Grandmother     Melanoma Neg Hx     Lupus Neg Hx     Psoriasis Neg Hx     Eczema Neg Hx        REVIEW OF SYSTEMS  Constitutional: No fever, chills, or weakness.  Eyes: No redness, pain, or discharge  HENT: No ear pain, + headache, no rhinorrhea, + throat pain  Respiratory: +productive cough,no wheezing or shortness of breath  Cardiovascular: No chest pain, palpitations or edema  GI: No abdominal pain, nausea, vomiting or diarrhea    All systems otherwise negative except as noted in the Review of Systems and History of Present Illness      PHYSICAL EXAM  Reviewed Triage Note  VITAL SIGNS: 118/78  Constitutional: Well developed, well nourished, Alert and oriented x3, No acute distress, non-toxic appearance.  HENT: Normocephalic, Atraumatic, Bilateral external ears normal, bilateral ear canals clear,  external nose negative, oropharynx moist, No oral exudates or edema, erythema noted bilaterally  Eyes: PERRL, EOMI, Conjunctiva normal, No discharge.  Neck: Normal range of motion, no tenderness, supple, no carotid bruits  Respiratory: Normal breath sounds, no respiratory distress, no wheezing, no rhonchi, no  rales  Cardiovascular: HR 62, normal rhythm, no murmurs, no rubs, no gallops.        LABS  Pertinent labs reviewed. (see chart for details)  Flu negative  Covid positive  Strep negative      RADIOLOGY  No orders to display     FINDINGS:  There is volume loss and postsurgical change of the right lung.  Remaining lungs clear.  Normal size heart.  Atherosclerosis.  No pleural effusion or pneumothorax.  Partial rib resection on the right.  Dextrocurvature upper thoracic spine.  Mild multilevel degenerative changes in the spine.        Electronically signed by: Yash Lind  Date:                                            10/31/2023  Time:                                           12:25  ED COURSE & MEDICAL DECISION MAKING    Physical exam findings, lab and radiology results discussed with patient. No acute emergent medical condition identified at this time to warrant further testing. Will dispo home with instructions to follow up with PCP tomorrow. Script for flonase, ninjacof and inhaler sent to pharmacy of choice with instructions on usage. Pt agrees with plan of care.     DISPOSITION  Patient discharged in stable condition       CLINICAL IMPRESSION:  The primary encounter diagnosis was COVID. Diagnoses of Productive cough and Sore throat were also pertinent to this visit.    Patient advised to follow-up with your PCP within 3 days for BP re-check if Blood Pressure was >120/80 without history of hypertension.

## 2023-11-03 ENCOUNTER — PATIENT MESSAGE (OUTPATIENT)
Dept: RESEARCH | Facility: HOSPITAL | Age: 68
End: 2023-11-03
Payer: MEDICARE

## 2024-02-19 ENCOUNTER — TELEPHONE (OUTPATIENT)
Dept: HEMATOLOGY/ONCOLOGY | Facility: CLINIC | Age: 69
End: 2024-02-19

## 2024-02-26 ENCOUNTER — OFFICE VISIT (OUTPATIENT)
Dept: HEMATOLOGY/ONCOLOGY | Facility: CLINIC | Age: 69
End: 2024-02-26
Payer: MEDICARE

## 2024-02-26 VITALS
RESPIRATION RATE: 16 BRPM | WEIGHT: 156.31 LBS | TEMPERATURE: 97 F | SYSTOLIC BLOOD PRESSURE: 143 MMHG | HEIGHT: 66 IN | DIASTOLIC BLOOD PRESSURE: 80 MMHG | BODY MASS INDEX: 25.12 KG/M2 | HEART RATE: 60 BPM

## 2024-02-26 DIAGNOSIS — C34.10 PANCOAST TUMOR, UNSPECIFIED LATERALITY: ICD-10-CM

## 2024-02-26 DIAGNOSIS — C44.90 SKIN CANCER: Chronic | ICD-10-CM

## 2024-02-26 DIAGNOSIS — D61.818 OTHER PANCYTOPENIA: ICD-10-CM

## 2024-02-26 DIAGNOSIS — C44.121: ICD-10-CM

## 2024-02-26 DIAGNOSIS — C34.11 MALIGNANT NEOPLASM OF UPPER LOBE OF RIGHT LUNG: Primary | ICD-10-CM

## 2024-02-26 DIAGNOSIS — C34.11 MALIGNANT NEOPLASM OF UPPER LOBE, RIGHT BRONCHUS OR LUNG: ICD-10-CM

## 2024-02-26 DIAGNOSIS — C44.92 SQUAMOUS CELL SKIN CANCER: ICD-10-CM

## 2024-02-26 DIAGNOSIS — Z90.49 HISTORY OF PARTIAL COLECTOMY: ICD-10-CM

## 2024-02-26 DIAGNOSIS — D61.810 PANCYTOPENIA DUE TO CHEMOTHERAPY: Chronic | ICD-10-CM

## 2024-02-26 DIAGNOSIS — G89.3 CANCER ASSOCIATED PAIN: ICD-10-CM

## 2024-02-26 PROCEDURE — 99214 OFFICE O/P EST MOD 30 MIN: CPT | Mod: S$GLB,,, | Performed by: INTERNAL MEDICINE

## 2024-03-04 ENCOUNTER — HOSPITAL ENCOUNTER (OUTPATIENT)
Dept: RADIOLOGY | Facility: HOSPITAL | Age: 69
Discharge: HOME | End: 2024-03-04
Attending: INTERNAL MEDICINE
Payer: MEDICARE

## 2024-03-04 DIAGNOSIS — C34.11 MALIGNANT NEOPLASM OF UPPER LOBE OF RIGHT LUNG: ICD-10-CM

## 2024-03-04 DIAGNOSIS — C44.121: ICD-10-CM

## 2024-03-04 PROCEDURE — 74177 CT ABD & PELVIS W/CONTRAST: CPT | Mod: TC

## 2024-03-04 PROCEDURE — A9698 NON-RAD CONTRAST MATERIALNOC: HCPCS

## 2024-03-04 PROCEDURE — 25500020 PHARM REV CODE 255

## 2024-03-04 PROCEDURE — 74177 CT ABD & PELVIS W/CONTRAST: CPT | Mod: 26,,, | Performed by: RADIOLOGY

## 2024-03-04 PROCEDURE — 71260 CT THORAX DX C+: CPT | Mod: 26,,, | Performed by: RADIOLOGY

## 2024-03-04 RX ADMIN — IOHEXOL 75 ML: 350 INJECTION, SOLUTION INTRAVENOUS at 02:03

## 2024-03-04 RX ADMIN — IOHEXOL 1000 ML: 12 SOLUTION ORAL at 02:03

## 2024-03-05 ENCOUNTER — TELEPHONE (OUTPATIENT)
Dept: HEMATOLOGY/ONCOLOGY | Facility: CLINIC | Age: 69
End: 2024-03-05

## 2024-03-05 DIAGNOSIS — N28.89 BILATERAL RENAL MASSES: Primary | ICD-10-CM

## 2024-03-14 ENCOUNTER — HOSPITAL ENCOUNTER (OUTPATIENT)
Dept: RADIOLOGY | Facility: HOSPITAL | Age: 69
Discharge: HOME | End: 2024-03-14
Attending: NURSE PRACTITIONER
Payer: MEDICARE

## 2024-03-14 DIAGNOSIS — N28.89 BILATERAL RENAL MASSES: ICD-10-CM

## 2024-03-14 PROCEDURE — 76770 US EXAM ABDO BACK WALL COMP: CPT | Mod: TC,PO

## 2024-03-15 ENCOUNTER — TELEPHONE (OUTPATIENT)
Dept: HEMATOLOGY/ONCOLOGY | Facility: CLINIC | Age: 69
End: 2024-03-15

## 2024-08-20 ENCOUNTER — TELEPHONE (OUTPATIENT)
Facility: CLINIC | Age: 69
End: 2024-08-20
Payer: MEDICARE

## 2024-08-20 NOTE — TELEPHONE ENCOUNTER
I spoke with pt  and reminded him to have pt get labs done prior to appt on 8/26/24 he verbalized understanding.

## 2024-08-28 ENCOUNTER — TELEPHONE (OUTPATIENT)
Dept: GASTROENTEROLOGY | Facility: CLINIC | Age: 69
End: 2024-08-28
Payer: MEDICARE

## 2024-08-28 ENCOUNTER — PATIENT MESSAGE (OUTPATIENT)
Dept: GASTROENTEROLOGY | Facility: CLINIC | Age: 69
End: 2024-08-28
Payer: MEDICARE

## 2024-08-28 LAB
ALBUMIN SERPL-MCNC: 4.3 G/DL (ref 3.6–5.1)
ALBUMIN/GLOB SERPL: 1.6 (CALC) (ref 1–2.5)
ALP SERPL-CCNC: 86 U/L (ref 37–153)
ALT SERPL-CCNC: 13 U/L (ref 6–29)
AST SERPL-CCNC: 16 U/L (ref 10–35)
BASOPHILS # BLD AUTO: 11 CELLS/UL (ref 0–200)
BASOPHILS NFR BLD AUTO: 0.2 %
BILIRUB SERPL-MCNC: 0.5 MG/DL (ref 0.2–1.2)
BUN SERPL-MCNC: 20 MG/DL (ref 7–25)
BUN/CREAT SERPL: NORMAL (CALC) (ref 6–22)
CALCIUM SERPL-MCNC: 9.4 MG/DL (ref 8.6–10.4)
CEA SERPL-MCNC: 2.3 NG/ML
CHLORIDE SERPL-SCNC: 104 MMOL/L (ref 98–110)
CO2 SERPL-SCNC: 28 MMOL/L (ref 20–32)
CREAT SERPL-MCNC: 0.92 MG/DL (ref 0.5–1.05)
EGFR: 67 ML/MIN/1.73M2
EOSINOPHIL # BLD AUTO: 0 CELLS/UL (ref 15–500)
EOSINOPHIL NFR BLD AUTO: 0 %
ERYTHROCYTE [DISTWIDTH] IN BLOOD BY AUTOMATED COUNT: 13.9 % (ref 11–15)
GLOBULIN SER CALC-MCNC: 2.7 G/DL (CALC) (ref 1.9–3.7)
GLUCOSE SERPL-MCNC: 92 MG/DL (ref 65–139)
HCT VFR BLD AUTO: 44 % (ref 35–45)
HGB BLD-MCNC: 14.3 G/DL (ref 11.7–15.5)
LYMPHOCYTES # BLD AUTO: 1221 CELLS/UL (ref 850–3900)
LYMPHOCYTES NFR BLD AUTO: 22.2 %
MCH RBC QN AUTO: 29.3 PG (ref 27–33)
MCHC RBC AUTO-ENTMCNC: 32.5 G/DL (ref 32–36)
MCV RBC AUTO: 90.2 FL (ref 80–100)
MONOCYTES # BLD AUTO: 644 CELLS/UL (ref 200–950)
MONOCYTES NFR BLD AUTO: 11.7 %
NEUTROPHILS # BLD AUTO: 3625 CELLS/UL (ref 1500–7800)
NEUTROPHILS NFR BLD AUTO: 65.9 %
PLATELET # BLD AUTO: 128 THOUSAND/UL (ref 140–400)
PMV BLD REES-ECKER: 10.1 FL (ref 7.5–12.5)
POTASSIUM SERPL-SCNC: 4.4 MMOL/L (ref 3.5–5.3)
PROT SERPL-MCNC: 7 G/DL (ref 6.1–8.1)
RBC # BLD AUTO: 4.88 MILLION/UL (ref 3.8–5.1)
SODIUM SERPL-SCNC: 142 MMOL/L (ref 135–146)
WBC # BLD AUTO: 5.5 THOUSAND/UL (ref 3.8–10.8)

## 2024-08-28 NOTE — PROGRESS NOTES
[facility] Hematology/Oncology  PROGRESS NOTE -   Follow-up Visit      Subjective:       Patient ID:   NAME: Antoinette Hayden : 1955     69 y.o. female    Referring Doc: Zulma  Other Physicians: Qi Osborne Fosella; Luann Lee        Chief Complaint: hx/of lung cancer      History of Present Illness:     Patient returns today for a regularly scheduled follow-up visit.  The patient is here today to go over the results of the recently ordered labs, tests and studies. She is here by herself.     She has had a recent upper respiratory illness for the past week with sinus drainage  and cough; she initially had a fever which has since broke; she did not go get tested for covid; she reports she is feeling better    She saw MD Menchaca in 2023 and she has been discharged for f/u with them but will remain available PRN.     She saw Dr Maya just yesterday with [facility]; she had CT scan  on 3/4/2024    Breathing ok but she has some chronic BAKER. No CP, HA's or N/V                   ROS:   GEN: normal without any fever, night sweats or weight loss  HEENT: normal with no HA's, sore throat, stiff neck, changes in vision  CV: normal with no CP, SOB, PND, chronic BAKER    PULM:  recent upper respiratory illness for the past week with sinus drainage  and cough  GI: normal with no abdominal pain, nausea, vomiting, constipation, diarrhea, melanotic stools, BRBPR, or hematemesis  : normal with no hematuria, dysuria  BREAST: normal with no mass, discharge, pain  SKIN: normal with no rash, erythema, bruising, or swelling    Pain Scale:  0    Allergies:  Review of patient's allergies indicates:   Allergen Reactions    Egg derived Nausea And Vomiting     Patient tolerated propofol, no allergic reaction. - MARIA ISABEL Hurt MD (14)  Patient tolerated propofol, no allergic reaction. - MARIA ISABEL Hurt MD (14)    Egg     No known drug allergies        Medications:    Current Outpatient Medications:     acetaminophen-codeine  300-30mg (TYLENOL #3) 300-30 mg Tab, Take 1 tablet by mouth every 6 (six) hours as needed., Disp: , Rfl:     biotin 5,000 mcg TbDL, Take 5,000 mcg by mouth once daily., Disp: , Rfl:     cetirizine (ZYRTEC) 10 MG tablet, Take 1 tablet (10 mg total) by mouth once daily., Disp: 30 tablet, Rfl: 0    co-enzyme Q-10 30 mg capsule, Take 30 mg by mouth 3 (three) times daily., Disp: , Rfl:     EScitalopram oxalate (LEXAPRO) 10 MG tablet, Take 1 tablet (10 mg total) by mouth once daily., Disp: 90 tablet, Rfl: 0    famotidine (PEPCID) 40 MG tablet, Take 1 tablet (40 mg total) by mouth every evening., Disp: 90 tablet, Rfl: 0    folic acid 20 mg Cap, Take 20 mg by mouth every evening., Disp: , Rfl:     gabapentin (NEURONTIN) 300 MG capsule, Take 1 capsule (300 mg total) by mouth 3 (three) times daily as needed., Disp: 90 capsule, Rfl: 0    glucosamine-chondroitin 500-400 mg Cap, Take 2 tablets by mouth. , Disp: , Rfl:     ibuprofen (ADVIL,MOTRIN) 200 MG tablet, Take 200 mg by mouth every 6 (six) hours as needed., Disp: , Rfl:     levothyroxine (SYNTHROID) 25 MCG tablet, Take 1 tablet (25 mcg total) by mouth every evening., Disp: 90 tablet, Rfl: 0    LYSINE ORAL, Take by mouth., Disp: , Rfl:     magnesium 30 mg Tab, Take by mouth once., Disp: , Rfl:     MULTIVIT WITH CALCIUM,IRON,MIN (WOMEN'S DAILY MULTIVITAMIN ORAL), Take by mouth every morning. , Disp: , Rfl:     oxybutynin (DITROPAN-XL) 10 MG 24 hr tablet, Take 1 tablet (10 mg total) by mouth once daily., Disp: 90 tablet, Rfl: 0    simethicone (GAS-X ORAL), Take 1 application  by mouth once daily., Disp: , Rfl:     simvastatin (ZOCOR) 20 MG tablet, Take 1 tablet (20 mg total) by mouth every evening., Disp: 90 tablet, Rfl: 0    vitamin D (VITAMIN D3) 1000 units Tab, Take 1,500 Units by mouth once daily., Disp: , Rfl:     PMHx/PSHx Updates:  See patient's last visit with me on 2/26/2024  See H&P on 8/26/2022        Pathology:   Cancer Staging   Lung cancer, upper lobe  Staging  "form: Lung, AJCC 7th Edition  - Clinical: Stage IIB (T3, N0, M0) - Signed by Gurpreet Andino MD on 5/8/2013  - Pathologic: No stage assigned - Unsigned            Objective:     Vitals:  Blood pressure (!) 147/70, pulse 64, temperature 97.1 °F (36.2 °C), temperature source Temporal, resp. rate 18, height 5' 6" (1.676 m), weight 73.3 kg (161 lb 8 oz).    Physical Examination:   GEN: no apparent distress, comfortable; AAOx3  HEAD: atraumatic and normocephalic  EYES: no pallor, no icterus, PERRLA  ENT: OMM, no pharyngeal erythema, external ears WNL; no nasal discharge; no thrush; s/p Moh's on nares  NECK: no masses, thyroid normal, trachea midline, no LAD/LN's, supple  CV: RRR with no murmur; normal pulse; normal S1 and S2; no pedal edema  CHEST: Normal respiratory effort; CTAB; normal breath sounds; no wheeze or crackles   ABDOM: nontender and nondistended; soft; normal bowel sounds; no rebound/guarding  MUSC/Skeletal: ROM normal; no crepitus; joints normal; no deformities or arthropathy; C-shaped scar on right back   EXTREM: no clubbing, cyanosis, inflammation or swelling  SKIN: s/p recent flap surgery on sternum ; chronic age related skin changes; scars on arms, etc  : no bar  NEURO: grossly intact; motor/sensory WNL; AAOx3; no tremors  PSYCH: normal mood, affect and behavior  LYMPH: normal cervical, supraclavicular, axillary and groin LN's            Labs:     Lab Results   Component Value Date    WBC 5.5 08/27/2024    HGB 14.3 08/27/2024    HCT 44.0 08/27/2024    MCV 90.2 08/27/2024     (L) 08/27/2024     CMP  Sodium   Date Value Ref Range Status   08/27/2024 142 135 - 146 mmol/L Final     Potassium   Date Value Ref Range Status   08/27/2024 4.4 3.5 - 5.3 mmol/L Final     Chloride   Date Value Ref Range Status   08/27/2024 104 98 - 110 mmol/L Final     CO2   Date Value Ref Range Status   08/27/2024 28 20 - 32 mmol/L Final     Glucose   Date Value Ref Range Status   08/27/2024 92 65 - 139 mg/dL Final     " Comment:               Non-fasting reference interval          BUN   Date Value Ref Range Status   08/27/2024 20 7 - 25 mg/dL Final     Creatinine   Date Value Ref Range Status   08/27/2024 0.92 0.50 - 1.05 mg/dL Final     Calcium   Date Value Ref Range Status   08/27/2024 9.4 8.6 - 10.4 mg/dL Final     Total Protein   Date Value Ref Range Status   08/27/2024 7.0 6.1 - 8.1 g/dL Final     Albumin   Date Value Ref Range Status   08/27/2024 4.3 3.6 - 5.1 g/dL Final     Total Bilirubin   Date Value Ref Range Status   08/27/2024 0.5 0.2 - 1.2 mg/dL Final     Alkaline Phosphatase   Date Value Ref Range Status   05/07/2020 77 37 - 153 U/L Final     AST   Date Value Ref Range Status   08/27/2024 16 10 - 35 U/L Final     ALT   Date Value Ref Range Status   08/27/2024 13 6 - 29 U/L Final     Anion Gap   Date Value Ref Range Status   01/12/2015 13 8 - 16 mmol/L Final     eGFR   Date Value Ref Range Status   08/27/2024 67 > OR = 60 mL/min/1.73m2 Final        CARCINOEMBRYONIC ANTIGEN (CEA) - [facility] See Note: ng/mL 2.3         Radiology/Diagnostic Studies:    US retroperitoneal 3/14/2024:    IMPRESSION:  1. Multiple simple right renal cysts, with no solid renal masses demonstrated.  2. A 4 mm nonobstructing right renal calculus.        CT scans 3/4/2024      Impression:     Postoperative operative changes in the chest consistent with the history of right upper lobectomy.  Emphysematous changes old granulomatous disease, and mild atelectatic or fibrotic stranding with no acute findings and no findings suggesting metastatic disease     Within the abdomen and pelvis no acute findings and no findings suggesting metastatic disease     Findings as detailed above including cholelithiasis without CT findings of acute cholecystitis, diverticulosis without CT findings of acute diverticulitis.  Low-density renal masses most too small to reliably characterize.  One in the right kidney that measures 1.6 cm has CT numbers slightly greater than  expected for a simple cyst and suggest follow-up ultrasound for confirmation.     Multiple mild thoracic vertebral body compression some of are new compared to 08/06/2015      CT Chest 4/10/2023:      Impression      1.  Stable right upper lobectomy with en bloc chest wall resection.   2.  No evidence of recurrent or metastatic disease.   3.  Left lower lobe ground glass opacity may reflect atypical adenomatous hyperplasia, focal fibrosis right limb adenocarcinoma in situ. Continue long-term follow-up.   4.  Multivessel coronary artery disease. Aortic valve calcification without aortic dilatation.        CT Chest without  4/7/2022  ([facility])        Anatomical Region Laterality Modality   Chest -- Computed Tomography         Impression       No evidence of recurrent or metastatic disease. New peripheral ground glass opacities in the lungs are suspicious for viral infection.     COVID Impression category:   Commonly reported imaging features of COVID-19 pneumonia are present and involve less than 50% of the lung volume. Other processes such as influenza pneumonia and organizing pneumonia, as can be seen with drug toxicity and connective tissue disease, can cause a similar imaging pattern.             I have reviewed all available lab results and radiology reports.    Assessment/Plan:   (1) 69 y.o. female with diagnosis of history of right upper lobe lung cancer who has been referred by Dr Maya for evaluation by medical hematology/oncology. 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 She had a stage IIB (T3 N0M0) lung cancer and underwent concomitant chemoradiation followed by surgical resection in Sept 2013..   - NSCLC (adenocarcinoma) - pancoast  - stage IIB  - s/p carboplatin and pembro x4  - she was treated at [facility] in [place] where she underwent surgery with Dr Osborne on 9/26/2013 with lobectomy and chest wall reconstruction and followed at [facility] by Dr Gurpreet Welch  with thoracic oncology  - treated by Dr Gurpreet Andino in past and followed by Dr Roverto Busby, both  with [place]cology at [facility] in past     8/26/2022:  - she returns to [facility] in April 2023  - she had recent Chest CT at [facility] iN April 2022  - f/u with Dr Maya and Dr Richardson  - patient encouraged to get dermatologist     2/27/2023:  - she is seeing Dr Maya in near future  - she goes back to [facility] on April 10-11th 2023 8/28/2023:  - he saw MD Menchaca in April 2023 and she has been discharged for f/u with them but will remain avaialble PRN. She had CT scan there on 4/10/2023  - due for repeat CEA    2/26/2023:  - CEA is <2.0  - repeat CT Chest/Abdom in April 2024  - f/u with [facility] prn  - she had colonoscopy in Aug 2023 with Dr Manrique and several polyps removed    8/29/2024:  - last CT scans in March 2024 and relatively stable  - US kidneys confirmed she just has cysts  - CEA 2.3       (2) Hypercholesterolemia     (3) Heart murmur     (4) Lacrimal duct stenosis     (5) Multiple skin cancers s/p Moh's procedure - followed by Dr Gris Watson with derm in past     (6) Hypothyroidism/thyorid nodule     (7) Chronic thoracic radiculopathy and pain syndrome due to the prior cancer     (8) Anxiety/depression     (9) Fam hx/of colon cancer     (10) Arthritis     (11) Former smoker (quit Jan 2012)     (12) Hx/of tubular adenoma s/p right hemicolectomy     (13) Recent surgery for SCCA involving anterior chest wall on 8/22/2022 s/p flap with Dr Alexandra         VISIT DIAGNOSES:      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 F/u at [facility] as needed from now on prn  2. F/u with Dr Richardson  - due for repeat colonsocopy  3. F/u with Dr Lee  4. Check CEA  every 3 months;  repeat scans in March 2025  5. Refer to pulmonary  RTC in  6 months   Fax note to Hilaria Maya Tandron     Discussion:     COVID-19 Discussion:     I had long discussion with patient and any applicable family about the COVID-19 coronavirus epidemic and the recommended precautions with regard to cancer and/or hematology patients. I have re-iterated the CDC recommendations for adequate hand washing, use of hand -like products, and coughing into elbow, etc. In addition, especially for our patients who are on chemotherapy and/or our otherwise immunocompromised patients, I have recommended avoidance of crowds, including movie theaters, restaurants, churches, etc. I have recommended avoidance of any sick or symptomatic family members and/or friends. I have also recommended avoidance of any raw and unwashed food products, and general avoidance of food items that have not been prepared by themselves. The patient has been asked to call us immediately with any symptom developments, issues, questions or other general concerns.      Pathology Discussion:     I reviewed and discussed the pathology report(s) and radiograph reports (if available) in as simple to understand and/or laymen's terms to the best of my ability. I had an indepth conversation with the patient and went over the patient's individual diagnosis based on the information that was currently available. I discussed the TNM staging process with regard to the patient's particular cancer type, and the calculated stage based on the currently available TNM data and literature. I discussed the available prognostic data with regard to the current staging information and how it relates to the prognosis of their particular neoplastic process.       NCCN Guidelines:     I discussed the available treatment option(s) in accordance with the latest literature from the NCCN Clinical Practice Guidelines for the patient's particular type of cancer disorder. The NCCN  "Guidelines provide a "document evidence-based (and) consensus-driven management" of the care of oncology patients. The treatment recommendations were made not only in accordance to the NCCN guidelines, but also factored in to account the patient's overall age, condition, performance status and their medical co-morbidities. I went over the risks and benefits of the the treatment options (if any could be made) with regard to their particular cancer type, their cancer stage, their age, and their co-morbidities.      I have explained and the patient understands all of  the current recommendation(s). I have answered all of their questions to the best of my ability and to their complete satisfaction.                I spent over 25 mins of time with the patient. Reviewed results of the recently ordered labs, tests and studies; made directives with regards to the results. Over half of this time was spent couseling and coordinating care.    I have explained all of the above in detail and the patient understands all of the current recommendation(s). I have answered all of their questions to the best of my ability and to their complete satisfaction.   The patient is to continue with the current management plan.            Electronically signed by Jason Mc MD               "

## 2024-08-29 ENCOUNTER — OFFICE VISIT (OUTPATIENT)
Facility: CLINIC | Age: 69
End: 2024-08-29
Payer: MEDICARE

## 2024-08-29 VITALS
HEART RATE: 64 BPM | BODY MASS INDEX: 25.96 KG/M2 | DIASTOLIC BLOOD PRESSURE: 70 MMHG | SYSTOLIC BLOOD PRESSURE: 147 MMHG | RESPIRATION RATE: 18 BRPM | TEMPERATURE: 97 F | HEIGHT: 66 IN | WEIGHT: 161.5 LBS

## 2024-08-29 DIAGNOSIS — C34.10 PANCOAST TUMOR, UNSPECIFIED LATERALITY: ICD-10-CM

## 2024-08-29 DIAGNOSIS — Z90.49 HISTORY OF PARTIAL COLECTOMY: ICD-10-CM

## 2024-08-29 DIAGNOSIS — C34.11 MALIGNANT NEOPLASM OF UPPER LOBE, RIGHT BRONCHUS OR LUNG: ICD-10-CM

## 2024-08-29 DIAGNOSIS — C34.11 MALIGNANT NEOPLASM OF UPPER LOBE OF RIGHT LUNG: ICD-10-CM

## 2024-08-29 DIAGNOSIS — Z98.890 STATUS POST MOHS SURGERY: ICD-10-CM

## 2024-08-29 DIAGNOSIS — D61.818 OTHER PANCYTOPENIA: ICD-10-CM

## 2024-08-29 DIAGNOSIS — C44.121: Primary | ICD-10-CM

## 2024-08-29 DIAGNOSIS — G89.3 CANCER ASSOCIATED PAIN: ICD-10-CM

## 2024-08-29 PROCEDURE — 99999 PR PBB SHADOW E&M-EST. PATIENT-LVL V: CPT | Mod: PBBFAC,,, | Performed by: INTERNAL MEDICINE

## 2024-08-29 PROCEDURE — 99215 OFFICE O/P EST HI 40 MIN: CPT | Mod: PBBFAC,PN | Performed by: INTERNAL MEDICINE

## 2024-10-22 ENCOUNTER — PATIENT MESSAGE (OUTPATIENT)
Dept: RESEARCH | Facility: HOSPITAL | Age: 69
End: 2024-10-22
Payer: MEDICARE

## 2024-11-11 NOTE — TELEPHONE ENCOUNTER
I spoke with pt and reminded her to have labs done prior to appt here on 2/26/24 pt states that she went and had them done earlier today.    Vacuum Extraction was not used

## 2024-11-20 ENCOUNTER — PATIENT MESSAGE (OUTPATIENT)
Dept: GASTROENTEROLOGY | Facility: CLINIC | Age: 69
End: 2024-11-20
Payer: MEDICARE

## 2024-11-22 ENCOUNTER — TELEPHONE (OUTPATIENT)
Dept: GASTROENTEROLOGY | Facility: CLINIC | Age: 69
End: 2024-11-22
Payer: MEDICARE

## 2024-11-22 NOTE — TELEPHONE ENCOUNTER
Talked to patient , New patient, Lauren Risk, needs colonoscopy, hx of polyps, will get records of previous colonoscopies and sent . Fax given. No further issues noted.

## 2024-11-26 ENCOUNTER — HOSPITAL ENCOUNTER (OUTPATIENT)
Dept: RADIOLOGY | Facility: HOSPITAL | Age: 69
Discharge: HOME | End: 2024-11-26
Attending: PHYSICAL MEDICINE & REHABILITATION
Payer: MEDICARE

## 2024-11-26 VITALS — HEIGHT: 66 IN | WEIGHT: 164.25 LBS | BODY MASS INDEX: 26.4 KG/M2

## 2024-11-26 DIAGNOSIS — Z12.31 ENCOUNTER FOR SCREENING MAMMOGRAM FOR MALIGNANT NEOPLASM OF BREAST: ICD-10-CM

## 2024-11-26 PROCEDURE — 77063 BREAST TOMOSYNTHESIS BI: CPT | Mod: 26,,, | Performed by: RADIOLOGY

## 2024-11-26 PROCEDURE — 77067 SCR MAMMO BI INCL CAD: CPT | Mod: TC,PO

## 2024-11-26 PROCEDURE — 77067 SCR MAMMO BI INCL CAD: CPT | Mod: 26,,, | Performed by: RADIOLOGY

## 2025-03-13 ENCOUNTER — HOSPITAL ENCOUNTER (OUTPATIENT)
Dept: RADIOLOGY | Facility: HOSPITAL | Age: 70
Discharge: HOME | End: 2025-03-13
Attending: INTERNAL MEDICINE
Payer: MEDICARE

## 2025-03-13 ENCOUNTER — RESULTS FOLLOW-UP (OUTPATIENT)
Facility: CLINIC | Age: 70
End: 2025-03-13

## 2025-03-13 ENCOUNTER — HOSPITAL ENCOUNTER (OUTPATIENT)
Dept: RADIOLOGY | Facility: HOSPITAL | Age: 70
Discharge: HOME | End: 2025-03-13
Attending: PHYSICAL MEDICINE & REHABILITATION
Payer: MEDICARE

## 2025-03-13 DIAGNOSIS — E03.9 ACQUIRED HYPOTHYROIDISM: ICD-10-CM

## 2025-03-13 DIAGNOSIS — C34.11 MALIGNANT NEOPLASM OF UPPER LOBE OF RIGHT LUNG: ICD-10-CM

## 2025-03-13 LAB
CREAT SERPL-MCNC: 1.1 MG/DL (ref 0.5–1.4)
SAMPLE: NORMAL

## 2025-03-13 PROCEDURE — 76536 US EXAM OF HEAD AND NECK: CPT | Mod: TC,PO

## 2025-03-13 PROCEDURE — 74177 CT ABD & PELVIS W/CONTRAST: CPT | Mod: 26,,, | Performed by: RADIOLOGY

## 2025-03-13 PROCEDURE — 25500020 PHARM REV CODE 255: Mod: PO | Performed by: INTERNAL MEDICINE

## 2025-03-13 PROCEDURE — 74177 CT ABD & PELVIS W/CONTRAST: CPT | Mod: TC,PO

## 2025-03-13 PROCEDURE — 71260 CT THORAX DX C+: CPT | Mod: 26,,, | Performed by: RADIOLOGY

## 2025-03-13 PROCEDURE — 76536 US EXAM OF HEAD AND NECK: CPT | Mod: 26,,, | Performed by: RADIOLOGY

## 2025-03-13 RX ADMIN — IOHEXOL 100 ML: 350 INJECTION, SOLUTION INTRAVENOUS at 10:03

## 2025-04-09 ENCOUNTER — OFFICE VISIT (OUTPATIENT)
Facility: CLINIC | Age: 70
End: 2025-04-09
Payer: MEDICARE

## 2025-04-09 VITALS
DIASTOLIC BLOOD PRESSURE: 84 MMHG | RESPIRATION RATE: 16 BRPM | HEIGHT: 66 IN | SYSTOLIC BLOOD PRESSURE: 146 MMHG | OXYGEN SATURATION: 98 % | BODY MASS INDEX: 25.88 KG/M2 | WEIGHT: 161 LBS | HEART RATE: 65 BPM | TEMPERATURE: 97 F

## 2025-04-09 DIAGNOSIS — G89.3 CANCER ASSOCIATED PAIN: ICD-10-CM

## 2025-04-09 DIAGNOSIS — C44.90 SKIN CANCER: Chronic | ICD-10-CM

## 2025-04-09 DIAGNOSIS — Z90.49 HISTORY OF PARTIAL COLECTOMY: ICD-10-CM

## 2025-04-09 DIAGNOSIS — C34.10 PANCOAST TUMOR, UNSPECIFIED LATERALITY: ICD-10-CM

## 2025-04-09 DIAGNOSIS — Z85.118 PERSONAL HISTORY OF LUNG CANCER: ICD-10-CM

## 2025-04-09 DIAGNOSIS — C34.11 MALIGNANT NEOPLASM OF UPPER LOBE OF RIGHT LUNG: Primary | ICD-10-CM

## 2025-04-09 PROCEDURE — 99215 OFFICE O/P EST HI 40 MIN: CPT | Mod: PBBFAC,PN | Performed by: INTERNAL MEDICINE

## 2025-04-09 PROCEDURE — 99999 PR PBB SHADOW E&M-EST. PATIENT-LVL V: CPT | Mod: PBBFAC,,, | Performed by: INTERNAL MEDICINE

## 2025-04-09 NOTE — PROGRESS NOTES
[facility] Hematology/Oncology  PROGRESS NOTE -   Follow-up Visit      Subjective:       Patient ID:   NAME: Antoinette Hayden : 1955     69 y.o. female    Referring Doc: Zulma  Other Physicians: Qi Osborne, Eryn; Luann Lee        Chief Complaint: hx/of lung cancer      History of Present Illness:     Patient returns today for a regularly scheduled follow-up visit.  The patient is here today to go over the results of the recently ordered labs, tests and studies. She is here by herself.     She had recent scans on 3/13/2025 which appear to be stable    She saw [facility] in 2023 and she has been discharged for f/u with them but will remain available PRN.     She saw Dr Maya on 2/10/2025    Breathing ok but she has some chronic BAKER. No CP, HA's or N/V                   ROS:   GEN: normal without any fever, night sweats or weight loss  HEENT: normal with no HA's, sore throat, stiff neck, changes in vision  CV: normal with no CP, SOB, PND, chronic BAKER    PULM:  breathing ok   GI: normal with no abdominal pain, nausea, vomiting, constipation, diarrhea, melanotic stools, BRBPR, or hematemesis  : normal with no hematuria, dysuria  BREAST: normal with no mass, discharge, pain  SKIN: normal with no rash, erythema, bruising, or swelling    Pain Scale:  0    Allergies:  Review of patient's allergies indicates:   Allergen Reactions    Egg derived Nausea And Vomiting     Patient tolerated propofol, no allergic reaction. - MARIA ISABEL Hurt MD (14)  Patient tolerated propofol, no allergic reaction. - MARIA ISABEL Hurt MD (14)    Egg     No known drug allergies        Medications:    Current Outpatient Medications:     biotin 5,000 mcg TbDL, Take 5,000 mcg by mouth once daily., Disp: , Rfl:     cetirizine (ZYRTEC) 10 MG tablet, Take 1 tablet (10 mg total) by mouth once daily., Disp: 30 tablet, Rfl: 0    co-enzyme Q-10 30 mg capsule, Take 30 mg by mouth 3 (three) times daily., Disp: , Rfl:     EScitalopram  "oxalate (LEXAPRO) 10 MG tablet, Take 1 tablet (10 mg total) by mouth once daily., Disp: 90 tablet, Rfl: 0    famotidine (PEPCID) 40 MG tablet, Take 1 tablet (40 mg total) by mouth every evening., Disp: 90 tablet, Rfl: 0    folic acid 20 mg Cap, Take 20 mg by mouth every evening., Disp: , Rfl:     gabapentin (NEURONTIN) 300 MG capsule, Take 1 capsule (300 mg total) by mouth 3 (three) times daily as needed., Disp: 90 capsule, Rfl: 0    glucosamine-chondroitin 500-400 mg Cap, Take 2 tablets by mouth. , Disp: , Rfl:     ibuprofen (ADVIL,MOTRIN) 200 MG tablet, Take 200 mg by mouth every 6 (six) hours as needed., Disp: , Rfl:     levothyroxine (SYNTHROID) 25 MCG tablet, Take 1 tablet (25 mcg total) by mouth every evening., Disp: 90 tablet, Rfl: 0    LYSINE ORAL, Take by mouth., Disp: , Rfl:     magnesium 30 mg Tab, Take by mouth once., Disp: , Rfl:     MULTIVIT WITH CALCIUM,IRON,MIN (WOMEN'S DAILY MULTIVITAMIN ORAL), Take by mouth every morning. , Disp: , Rfl:     oxybutynin (DITROPAN-XL) 10 MG 24 hr tablet, Take 1 tablet (10 mg total) by mouth once daily., Disp: 90 tablet, Rfl: 0    simvastatin (ZOCOR) 20 MG tablet, Take 1 tablet (20 mg total) by mouth every evening., Disp: 90 tablet, Rfl: 0    vitamin D (VITAMIN D3) 1000 units Tab, Take 1,500 Units by mouth once daily., Disp: , Rfl:     PMHx/PSHx Updates:  See patient's last visit with me on 8/29/2024  See H&P on 8/26/2022        Pathology:   Cancer Staging   Lung cancer, upper lobe  Staging form: Lung, AJCC 7th Edition  - Clinical: Stage IIB (T3, N0, M0) - Signed by Gurpreet Andino MD on 5/8/2013  - Pathologic: No stage assigned - Unsigned            Objective:     Vitals:  Blood pressure (!) 146/84, pulse 65, temperature 97.2 °F (36.2 °C), temperature source Temporal, resp. rate 16, height 5' 6" (1.676 m), weight 73 kg (161 lb), SpO2 98%.    Physical Examination:   GEN: no apparent distress, comfortable; AAOx3  HEAD: atraumatic and normocephalic  EYES: no pallor, no " icterus, PERRLA  ENT: OMM, no pharyngeal erythema, external ears WNL; no nasal discharge; no thrush; s/p Moh's on nares  NECK: no masses, thyroid normal, trachea midline, no LAD/LN's, supple  CV: RRR with no murmur; normal pulse; normal S1 and S2; no pedal edema  CHEST: Normal respiratory effort; CTAB; normal breath sounds; no wheeze or crackles   ABDOM: nontender and nondistended; soft; normal bowel sounds; no rebound/guarding  MUSC/Skeletal: ROM normal; no crepitus; joints normal; no deformities or arthropathy; C-shaped scar on right back   EXTREM: no clubbing, cyanosis, inflammation or swelling  SKIN: s/p recent flap surgery on sternum ; chronic age related skin changes; scars on arms, etc  : no bar  NEURO: grossly intact; motor/sensory WNL; AAOx3; no tremors  PSYCH: normal mood, affect and behavior  LYMPH: normal cervical, supraclavicular, axillary and groin LN's            Labs:     Lab Results   Component Value Date    WBC 5.9 02/11/2025    HGB 14.9 02/11/2025    HCT 44.1 02/11/2025    MCV 87.3 02/11/2025     (L) 02/11/2025     CMP  Sodium   Date Value Ref Range Status   02/11/2025 141 135 - 146 mmol/L Final     Potassium   Date Value Ref Range Status   02/11/2025 4.5 3.5 - 5.3 mmol/L Final     Chloride   Date Value Ref Range Status   02/11/2025 106 98 - 110 mmol/L Final     CO2   Date Value Ref Range Status   02/11/2025 25 20 - 32 mmol/L Final     Glucose   Date Value Ref Range Status   02/11/2025 90 65 - 99 mg/dL Final     Comment:                   Fasting reference interval          BUN   Date Value Ref Range Status   02/11/2025 16 7 - 25 mg/dL Final     Creatinine   Date Value Ref Range Status   02/11/2025 0.92 0.50 - 1.05 mg/dL Final     Calcium   Date Value Ref Range Status   02/11/2025 9.6 8.6 - 10.4 mg/dL Final     Total Protein   Date Value Ref Range Status   02/11/2025 6.9 6.1 - 8.1 g/dL Final     Albumin   Date Value Ref Range Status   02/11/2025 4.5 3.6 - 5.1 g/dL Final     Total  Bilirubin   Date Value Ref Range Status   02/11/2025 0.6 0.2 - 1.2 mg/dL Final     Alkaline Phosphatase   Date Value Ref Range Status   05/07/2020 77 37 - 153 U/L Final     AST   Date Value Ref Range Status   02/11/2025 16 10 - 35 U/L Final     ALT   Date Value Ref Range Status   02/11/2025 19 6 - 29 U/L Final     Anion Gap   Date Value Ref Range Status   01/12/2015 13 8 - 16 mmol/L Final     eGFR   Date Value Ref Range Status   02/11/2025 67 > OR = 60 mL/min/1.73m2 Final        CARCINOEMBRYONIC ANTIGEN (CEA) - [facility] See Note: ng/mL 2.2         Radiology/Diagnostic Studies:    CT scans 3/13/2025:    Impression:     1. Postoperative changes of right upper lobectomy.  2. No evidence of recurrent malignancy or metastatic disease.  3. Unchanged emphysema and coronary artery calcifications.  4. Unchanged cholelithiasis.  5. Unchanged nonobstructing right renal calculus.    US thyroid  3/13/2025:  Impression:     Small thyroid nodules as outlined above without adverse interval change from the previous exam.     ACR TI-RADS RECOMMENDATIONS:     TR1 (0 points) - No FNA or follow-up     TR2 (2 points) - No FNA or follow-up     TR3 (3 points) - FNA if ? 2.5 cm, follow-up if 1.5-2.4 cm in 1, 3 and 5 years     TR4 (4-6 points) - FNA if ? 1.5 cm, follow-up if 1-1.4 cm in 1, 2, 3 and 5 years.     TR5 (?7 points) - FNA if ? 1 cm, follow-up if 0.5-0.9 cm every year for 5 years.     Normal adult thyroid volumes (WHO guidelines):     Males: 12-18 mL (6-9 mL/lobe)     Females: 10-15 mL (5-7.5 mL/lobe)      Mammo 11/26/2024:  Impression:     Benign mammogram.            US retroperitoneal 3/14/2024:    IMPRESSION:  1. Multiple simple right renal cysts, with no solid renal masses demonstrated.  2. A 4 mm nonobstructing right renal calculus.        CT scans 3/4/2024      Impression:     Postoperative operative changes in the chest consistent with the history of right upper lobectomy.  Emphysematous changes old granulomatous disease, and  mild atelectatic or fibrotic stranding with no acute findings and no findings suggesting metastatic disease     Within the abdomen and pelvis no acute findings and no findings suggesting metastatic disease     Findings as detailed above including cholelithiasis without CT findings of acute cholecystitis, diverticulosis without CT findings of acute diverticulitis.  Low-density renal masses most too small to reliably characterize.  One in the right kidney that measures 1.6 cm has CT numbers slightly greater than expected for a simple cyst and suggest follow-up ultrasound for confirmation.     Multiple mild thoracic vertebral body compression some of are new compared to 08/06/2015      CT Chest 4/10/2023:      Impression      1.  Stable right upper lobectomy with en bloc chest wall resection.   2.  No evidence of recurrent or metastatic disease.   3.  Left lower lobe ground glass opacity may reflect atypical adenomatous hyperplasia, focal fibrosis right limb adenocarcinoma in situ. Continue long-term follow-up.   4.  Multivessel coronary artery disease. Aortic valve calcification without aortic dilatation.        CT Chest without  4/7/2022  ([facility])        Anatomical Region Laterality Modality   Chest -- Computed Tomography         Impression       No evidence of recurrent or metastatic disease. New peripheral ground glass opacities in the lungs are suspicious for viral infection.     COVID Impression category:   Commonly reported imaging features of COVID-19 pneumonia are present and involve less than 50% of the lung volume. Other processes such as influenza pneumonia and organizing pneumonia, as can be seen with drug toxicity and connective tissue disease, can cause a similar imaging pattern.             I have reviewed all available lab results and radiology reports.    Assessment/Plan:   (1) 69 y.o. female with diagnosis of history of right upper lobe lung cancer who has been referred by Dr Maya for evaluation by  medical hematology/oncology. 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 She had a stage IIB (T3 N0M0) lung cancer and underwent concomitant chemoradiation followed by surgical resection in Sept 2013..   - NSCLC (adenocarcinoma) - pancoast  - stage IIB  - s/p carboplatin and pembro x4  - she was treated at [facility] in [place] where she underwent surgery with Dr Osborne on 9/26/2013 with lobectomy and chest wall reconstruction and followed at [facility] by Dr Guprreet Welch with thoracic oncology  - treated by Dr Gurpreet Andino in past and followed by Dr Roverto Busby, both  with Oncology at [facility] in past     8/26/2022:  - she returns to [facility] in April 2023  - she had recent Chest CT at [facility] iN April 2022  - f/u with Dr Maya and Dr Richardson  - patient encouraged to get dermatologist     2/27/2023:  - she is seeing Dr Maya in near future  - she goes back to [facility] on April 10-11th 2023 8/28/2023:  - he saw [facility] in April 2023 and she has been discharged for f/u with them but will remain avaialble PRN. She had CT scan there on 4/10/2023  - due for repeat CEA    2/26/2023:  - CEA is <2.0  - repeat CT Chest/Abdom in April 2024  - f/u with [facility] prn  - she had colonoscopy in Aug 2023 with Dr Manrique and several polyps removed    8/29/2024:  - last CT scans in March 2024 and relatively stable  - US kidneys confirmed she just has cysts  - CEA 2.3    4/9/2025:  - she had some recent Ct scans on 3/13/2025 which are reported as being stable  - latest CEA at 2.2  - latest CBC with WBC WNL at 5.9, Hgb adequate at 14.9  - mild chronic thrombocytopenia with latest plats at 136,000       (2) Hypercholesterolemia     (3) Heart murmur     (4) Lacrimal duct stenosis     (5) Multiple skin cancers s/p Moh's procedure - followed by Dr Gris Watson with derm in past     (6) Hypothyroidism/thyorid nodule     (7) Chronic thoracic radiculopathy and pain syndrome due to the prior cancer     (8)  Anxiety/depression     (9) Fam hx/of colon cancer     (10) Arthritis     (11) Former smoker (quit Jan 2012)     (12) Hx/of tubular adenoma s/p right hemicolectomy     (13) Prior surgery for SCCA involving anterior chest wall on 8/22/2022 s/p flap with Dr Alexandra         VISIT DIAGNOSES:      Malignant neoplasm of upper lobe of right lung    Pancoast tumor, unspecified laterality    Cancer associated pain    Skin cancer    History of partial colectomy    Personal history of lung cancer          PLAN:  1. F/u with PCP; sees [facility] only PRN  2. F/u with GI - refer to Dr Catherine  3. F/u with endocrinology  4. Check CEA  every 6 months; repeat scans in March 2026     RTC in  12 months   Fax note to Zulma      Discussion:     COVID-19 Discussion:     I had long discussion with patient and any applicable family about the COVID-19 coronavirus epidemic and the recommended precautions with regard to cancer and/or hematology patients. I have re-iterated the CDC recommendations for adequate hand washing, use of hand -like products, and coughing into elbow, etc. In addition, especially for our patients who are on chemotherapy and/or our otherwise immunocompromised patients, I have recommended avoidance of crowds, including movie theaters, restaurants, churches, etc. I have recommended avoidance of any sick or symptomatic family members and/or friends. I have also recommended avoidance of any raw and unwashed food products, and general avoidance of food items that have not been prepared by themselves. The patient has been asked to call us immediately with any symptom developments, issues, questions or other general concerns.      Pathology Discussion:     I reviewed and discussed the pathology report(s) and radiograph reports (if available) in as simple to understand and/or laymen's terms to the best of my ability. I had an indepth conversation with the patient and went over the patient's individual diagnosis based on the  "information that was currently available. I discussed the TNM staging process with regard to the patient's particular cancer type, and the calculated stage based on the currently available TNM data and literature. I discussed the available prognostic data with regard to the current staging information and how it relates to the prognosis of their particular neoplastic process.       NCCN Guidelines:     I discussed the available treatment option(s) in accordance with the latest literature from the NCCN Clinical Practice Guidelines for the patient's particular type of cancer disorder. The NCCN Guidelines provide a "document evidence-based (and) consensus-driven management" of the care of oncology patients. The treatment recommendations were made not only in accordance to the NCCN guidelines, but also factored in to account the patient's overall age, condition, performance status and their medical co-morbidities. I went over the risks and benefits of the the treatment options (if any could be made) with regard to their particular cancer type, their cancer stage, their age, and their co-morbidities.      I have explained and the patient understands all of  the current recommendation(s). I have answered all of their questions to the best of my ability and to their complete satisfaction.                I spent over 25 mins of time with the patient. Reviewed results of the recently ordered labs, tests and studies; made directives with regards to the results. Over half of this time was spent couseling and coordinating care.    I have explained all of the above in detail and the patient understands all of the current recommendation(s). I have answered all of their questions to the best of my ability and to their complete satisfaction.   The patient is to continue with the current management plan.            Electronically signed by Jason Mc MD                 Answers submitted by the patient for this visit:  Review " of Systems Questionnaire (Submitted on 4/2/2025)  appetite change : No  unexpected weight change: No  mouth sores: Yes  visual disturbance: No  cough: No  shortness of breath: No  chest pain: No  abdominal pain: No  diarrhea: Yes  frequency: No  back pain: Yes  rash: No  headaches: No  adenopathy: No  nervous/ anxious: No

## 2025-05-06 ENCOUNTER — OFFICE VISIT (OUTPATIENT)
Dept: URGENT CARE | Facility: CLINIC | Age: 70
End: 2025-05-06
Payer: MEDICARE

## 2025-05-06 VITALS
OXYGEN SATURATION: 96 % | RESPIRATION RATE: 20 BRPM | BODY MASS INDEX: 25.83 KG/M2 | TEMPERATURE: 98 F | DIASTOLIC BLOOD PRESSURE: 100 MMHG | SYSTOLIC BLOOD PRESSURE: 138 MMHG | HEART RATE: 67 BPM | WEIGHT: 155 LBS | HEIGHT: 65 IN

## 2025-05-06 DIAGNOSIS — R05.9 COUGH, UNSPECIFIED TYPE: Primary | ICD-10-CM

## 2025-05-06 DIAGNOSIS — J20.9 ACUTE BRONCHITIS, UNSPECIFIED ORGANISM: ICD-10-CM

## 2025-05-06 LAB
CTP QC/QA: YES
CTP QC/QA: YES
FLUAV AG NPH QL: NEGATIVE
FLUBV AG NPH QL: NEGATIVE
SARS CORONAVIRUS 2 ANTIGEN: NEGATIVE

## 2025-05-06 RX ORDER — PROMETHAZINE HYDROCHLORIDE AND DEXTROMETHORPHAN HYDROBROMIDE 6.25; 15 MG/5ML; MG/5ML
5 SYRUP ORAL EVERY 4 HOURS PRN
Qty: 180 ML | Refills: 0 | Status: SHIPPED | OUTPATIENT
Start: 2025-05-06 | End: 2025-05-16

## 2025-05-06 RX ORDER — DOXYCYCLINE HYCLATE 100 MG
100 TABLET ORAL 2 TIMES DAILY
Qty: 20 TABLET | Refills: 0 | Status: SHIPPED | OUTPATIENT
Start: 2025-05-06

## 2025-05-06 NOTE — PROGRESS NOTES
"Subjective:      Patient ID: Antoinette Hayden is a 70 y.o. female.    Vitals:  height is 5' 5" (1.651 m) and weight is 70.3 kg (155 lb). Her oral temperature is 98.1 °F (36.7 °C). Her blood pressure is 138/100 (abnormal) and her pulse is 67. Her respiration is 20 and oxygen saturation is 96%.     Chief Complaint: Cough  70 year old female presents to clinic complaining of productive cough and sinus congestion.  Symptoms onset several days ago.  No aggravating or alleviating factors.  Subjective fevers but no measured temperatures.  Complex medical history including lung cancer (chemo, radiation, and lobectomy 2013), hypothyroidism, anxiety.  Cough  This is a new problem. Episode onset: x 4 days. Associated symptoms include nasal congestion and postnasal drip.       Constitution: Negative.   HENT:  Positive for postnasal drip.    Neck: neck negative.   Cardiovascular: Negative.    Eyes: Negative.    Respiratory:  Positive for cough and sputum production.    Gastrointestinal: Negative.    Musculoskeletal: Negative.    Skin: Negative.  Negative for erythema.   Hematologic/Lymphatic: Negative.    Psychiatric/Behavioral: Negative.        Objective:     Physical Exam   Constitutional: She is oriented to person, place, and time.  Non-toxic appearance. She does not appear ill. No distress. normal  HENT:   Head: Normocephalic.   Ears:   Right Ear: Tympanic membrane normal.   Left Ear: Tympanic membrane normal.   Nose: Rhinorrhea and congestion present.   Mouth/Throat: Mucous membranes are moist. No oropharyngeal exudate or posterior oropharyngeal erythema. Oropharynx is clear.   Eyes: Pupils are equal, round, and reactive to light.   Cardiovascular: Normal rate, regular rhythm, normal heart sounds and normal pulses.   Pulmonary/Chest: Effort normal and breath sounds normal. No respiratory distress. She has no wheezes. She has no rales.   Abdominal: Normal appearance. Soft. There is no abdominal tenderness.   Neurological: no " focal deficit. She is alert and oriented to person, place, and time.   Skin: Skin is warm, dry, not pale and no rash. Capillary refill takes less than 2 seconds. No erythema   Psychiatric: Her behavior is normal. Mood, judgment and thought content normal.   Nursing note and vitals reviewed.      Assessment:     1. Cough, unspecified type    2. Acute bronchitis, unspecified organism        Plan:     Chest x-ray:     FINDINGS:  Multiple old right rib fractures are identified.  Similar finding is not seen on the left.  The mediastinal and cardiac size and contours normal.  An intrapulmonary mass or infiltrate is not seen.  No pneumothorax is noted.     Impression:     Multiple old right rib fractures.  Otherwise negative chest x-ray.    Cough, unspecified type  -     SARS Coronavirus 2 Antigen, POCT Manual Read  -     POCT Influenza A/B Rapid Antigen  -     XR CHEST PA AND LATERAL; Future; Expected date: 05/06/2025  -     doxycycline (VIBRA-TABS) 100 MG tablet; Take 1 tablet (100 mg total) by mouth 2 (two) times daily.  Dispense: 20 tablet; Refill: 0  -     promethazine-dextromethorphan (PROMETHAZINE-DM) 6.25-15 mg/5 mL Syrp; Take 5 mLs by mouth every 4 (four) hours as needed (cough).  Dispense: 180 mL; Refill: 0    Acute bronchitis, unspecified organism  -     doxycycline (VIBRA-TABS) 100 MG tablet; Take 1 tablet (100 mg total) by mouth 2 (two) times daily.  Dispense: 20 tablet; Refill: 0  -     promethazine-dextromethorphan (PROMETHAZINE-DM) 6.25-15 mg/5 mL Syrp; Take 5 mLs by mouth every 4 (four) hours as needed (cough).  Dispense: 180 mL; Refill: 0

## 2025-05-12 ENCOUNTER — HOSPITAL ENCOUNTER (OUTPATIENT)
Dept: RADIOLOGY | Facility: HOSPITAL | Age: 70
Discharge: HOME | End: 2025-05-12
Attending: PHYSICAL MEDICINE & REHABILITATION
Payer: MEDICARE

## 2025-05-12 DIAGNOSIS — S22.000A COMPRESSION FRACTURE OF BODY OF THORACIC VERTEBRA: ICD-10-CM

## 2025-05-12 PROCEDURE — 77080 DXA BONE DENSITY AXIAL: CPT | Mod: 26,,, | Performed by: RADIOLOGY

## 2025-05-12 PROCEDURE — 77080 DXA BONE DENSITY AXIAL: CPT | Mod: TC,PO
